# Patient Record
Sex: FEMALE | Race: WHITE | NOT HISPANIC OR LATINO | Employment: PART TIME | URBAN - METROPOLITAN AREA
[De-identification: names, ages, dates, MRNs, and addresses within clinical notes are randomized per-mention and may not be internally consistent; named-entity substitution may affect disease eponyms.]

---

## 2021-04-08 DIAGNOSIS — Z23 ENCOUNTER FOR IMMUNIZATION: ICD-10-CM

## 2021-10-26 ENCOUNTER — APPOINTMENT (EMERGENCY)
Dept: RADIOLOGY | Facility: HOSPITAL | Age: 66
End: 2021-10-26
Payer: MEDICARE

## 2021-10-26 ENCOUNTER — APPOINTMENT (EMERGENCY)
Dept: CT IMAGING | Facility: HOSPITAL | Age: 66
End: 2021-10-26
Payer: MEDICARE

## 2021-10-26 ENCOUNTER — HOSPITAL ENCOUNTER (EMERGENCY)
Facility: HOSPITAL | Age: 66
Discharge: HOME | End: 2021-10-26
Attending: EMERGENCY MEDICINE | Admitting: EMERGENCY MEDICINE
Payer: MEDICARE

## 2021-10-26 VITALS
OXYGEN SATURATION: 98 % | DIASTOLIC BLOOD PRESSURE: 70 MMHG | RESPIRATION RATE: 18 BRPM | TEMPERATURE: 98 F | BODY MASS INDEX: 25.11 KG/M2 | SYSTOLIC BLOOD PRESSURE: 119 MMHG | WEIGHT: 133 LBS | HEART RATE: 58 BPM | HEIGHT: 61 IN

## 2021-10-26 DIAGNOSIS — R42 DIZZINESS: Primary | ICD-10-CM

## 2021-10-26 LAB
ALBUMIN SERPL BCP-MCNC: 4.5 G/DL (ref 3.5–5)
ALP SERPL-CCNC: 90 U/L (ref 46–116)
ALT SERPL W P-5'-P-CCNC: 22 U/L (ref 12–78)
ANION GAP SERPL CALCULATED.3IONS-SCNC: 10 MMOL/L (ref 4–13)
AST SERPL W P-5'-P-CCNC: 20 U/L (ref 5–45)
BASOPHILS # BLD MANUAL: 0 THOUSAND/UL (ref 0–0.1)
BASOPHILS NFR MAR MANUAL: 0 % (ref 0–1)
BILIRUB SERPL-MCNC: 0.32 MG/DL (ref 0.2–1)
BILIRUB UR QL STRIP: NEGATIVE
BUN SERPL-MCNC: 7 MG/DL (ref 5–25)
CALCIUM SERPL-MCNC: 9.8 MG/DL (ref 8.3–10.1)
CHLORIDE SERPL-SCNC: 103 MMOL/L (ref 100–108)
CLARITY UR: CLEAR
CO2 SERPL-SCNC: 27 MMOL/L (ref 21–32)
COLOR UR: YELLOW
CREAT SERPL-MCNC: 0.84 MG/DL (ref 0.6–1.3)
EOSINOPHIL # BLD MANUAL: 0 THOUSAND/UL (ref 0–0.4)
EOSINOPHIL NFR BLD MANUAL: 0 % (ref 0–6)
ERYTHROCYTE [DISTWIDTH] IN BLOOD BY AUTOMATED COUNT: 14.2 % (ref 11.6–15.1)
GFR SERPL CREATININE-BSD FRML MDRD: 73 ML/MIN/1.73SQ M
GLUCOSE SERPL-MCNC: 100 MG/DL (ref 65–140)
GLUCOSE UR STRIP-MCNC: NEGATIVE MG/DL
HCT VFR BLD AUTO: 44.5 % (ref 34.8–46.1)
HGB BLD-MCNC: 14.3 G/DL (ref 11.5–15.4)
HGB UR QL STRIP.AUTO: NEGATIVE
KETONES UR STRIP-MCNC: NEGATIVE MG/DL
LEUKOCYTE ESTERASE UR QL STRIP: NEGATIVE
LYMPHOCYTES # BLD AUTO: 1.75 THOUSAND/UL (ref 0.6–4.47)
LYMPHOCYTES # BLD AUTO: 31 % (ref 14–44)
MAGNESIUM SERPL-MCNC: 2.2 MG/DL (ref 1.6–2.6)
MCH RBC QN AUTO: 28.3 PG (ref 26.8–34.3)
MCHC RBC AUTO-ENTMCNC: 32.1 G/DL (ref 31.4–37.4)
MCV RBC AUTO: 88 FL (ref 82–98)
MONOCYTES # BLD AUTO: 0.17 THOUSAND/UL (ref 0–1.22)
MONOCYTES NFR BLD: 3 % (ref 4–12)
NEUTROPHILS # BLD MANUAL: 3.72 THOUSAND/UL (ref 1.85–7.62)
NEUTS SEG NFR BLD AUTO: 66 % (ref 43–75)
NITRITE UR QL STRIP: NEGATIVE
PH UR STRIP.AUTO: 8 [PH]
PLATELET # BLD AUTO: 187 THOUSANDS/UL (ref 149–390)
PLATELET BLD QL SMEAR: ADEQUATE
PMV BLD AUTO: 10.7 FL (ref 8.9–12.7)
POTASSIUM SERPL-SCNC: 4 MMOL/L (ref 3.5–5.3)
PROT SERPL-MCNC: 8.1 G/DL (ref 6.4–8.2)
PROT UR STRIP-MCNC: NEGATIVE MG/DL
RBC # BLD AUTO: 5.06 MILLION/UL (ref 3.81–5.12)
RBC MORPH BLD: NORMAL
SODIUM SERPL-SCNC: 140 MMOL/L (ref 136–145)
SP GR UR STRIP.AUTO: 1.01 (ref 1–1.03)
TROPONIN I SERPL-MCNC: <0.02 NG/ML
UROBILINOGEN UR QL STRIP.AUTO: 0.2 E.U./DL
WBC # BLD AUTO: 5.64 THOUSAND/UL (ref 4.31–10.16)

## 2021-10-26 PROCEDURE — 85007 BL SMEAR W/DIFF WBC COUNT: CPT

## 2021-10-26 PROCEDURE — 80053 COMPREHEN METABOLIC PANEL: CPT

## 2021-10-26 PROCEDURE — 81003 URINALYSIS AUTO W/O SCOPE: CPT | Performed by: EMERGENCY MEDICINE

## 2021-10-26 PROCEDURE — 99285 EMERGENCY DEPT VISIT HI MDM: CPT | Performed by: EMERGENCY MEDICINE

## 2021-10-26 PROCEDURE — 83735 ASSAY OF MAGNESIUM: CPT

## 2021-10-26 PROCEDURE — 70496 CT ANGIOGRAPHY HEAD: CPT

## 2021-10-26 PROCEDURE — 36415 COLL VENOUS BLD VENIPUNCTURE: CPT

## 2021-10-26 PROCEDURE — 71045 X-RAY EXAM CHEST 1 VIEW: CPT

## 2021-10-26 PROCEDURE — 85027 COMPLETE CBC AUTOMATED: CPT

## 2021-10-26 PROCEDURE — 70498 CT ANGIOGRAPHY NECK: CPT

## 2021-10-26 PROCEDURE — 99284 EMERGENCY DEPT VISIT MOD MDM: CPT

## 2021-10-26 PROCEDURE — 84484 ASSAY OF TROPONIN QUANT: CPT

## 2021-10-26 PROCEDURE — 96360 HYDRATION IV INFUSION INIT: CPT

## 2021-10-26 PROCEDURE — 96361 HYDRATE IV INFUSION ADD-ON: CPT

## 2021-10-26 PROCEDURE — 93005 ELECTROCARDIOGRAM TRACING: CPT

## 2021-10-26 RX ORDER — ONDANSETRON 4 MG/1
4 TABLET, ORALLY DISINTEGRATING ORAL ONCE
Status: COMPLETED | OUTPATIENT
Start: 2021-10-26 | End: 2021-10-26

## 2021-10-26 RX ORDER — CLOPIDOGREL BISULFATE 75 MG/1
75 TABLET ORAL DAILY
COMMUNITY
End: 2022-04-03 | Stop reason: HOSPADM

## 2021-10-26 RX ORDER — AZITHROMYCIN 250 MG/1
TABLET, FILM COATED ORAL
Qty: 6 TABLET | Refills: 0 | Status: SHIPPED | OUTPATIENT
Start: 2021-10-26 | End: 2021-10-30

## 2021-10-26 RX ORDER — ONDANSETRON 4 MG/1
TABLET, ORALLY DISINTEGRATING ORAL
Status: DISCONTINUED
Start: 2021-10-26 | End: 2021-10-26 | Stop reason: HOSPADM

## 2021-10-26 RX ORDER — CITALOPRAM 20 MG/1
20 TABLET ORAL DAILY
COMMUNITY

## 2021-10-26 RX ORDER — MECLIZINE HCL 12.5 MG/1
25 TABLET ORAL ONCE
Status: COMPLETED | OUTPATIENT
Start: 2021-10-26 | End: 2021-10-26

## 2021-10-26 RX ORDER — MECLIZINE HCL 12.5 MG/1
TABLET ORAL
Status: DISCONTINUED
Start: 2021-10-26 | End: 2021-10-26 | Stop reason: HOSPADM

## 2021-10-26 RX ORDER — ASPIRIN 325 MG
325 TABLET ORAL DAILY
COMMUNITY

## 2021-10-26 RX ORDER — MECLIZINE HYDROCHLORIDE 25 MG/1
25 TABLET ORAL 3 TIMES DAILY PRN
Qty: 20 TABLET | Refills: 0 | Status: SHIPPED | OUTPATIENT
Start: 2021-10-26 | End: 2022-04-02

## 2021-10-26 RX ADMIN — MECLIZINE 25 MG: 12.5 TABLET ORAL at 10:50

## 2021-10-26 RX ADMIN — SODIUM CHLORIDE 1000 ML: 0.9 INJECTION, SOLUTION INTRAVENOUS at 10:50

## 2021-10-26 RX ADMIN — ONDANSETRON 4 MG: 4 TABLET, ORALLY DISINTEGRATING ORAL at 10:50

## 2021-10-26 RX ADMIN — IOHEXOL 100 ML: 350 INJECTION, SOLUTION INTRAVENOUS at 11:09

## 2021-10-28 LAB
ATRIAL RATE: 51 BPM
P AXIS: 69 DEGREES
PR INTERVAL: 162 MS
QRS AXIS: 31 DEGREES
QRSD INTERVAL: 76 MS
QT INTERVAL: 458 MS
QTC INTERVAL: 422 MS
T WAVE AXIS: 39 DEGREES
VENTRICULAR RATE: 51 BPM

## 2021-10-28 PROCEDURE — 93010 ELECTROCARDIOGRAM REPORT: CPT | Performed by: INTERNAL MEDICINE

## 2022-02-28 PROBLEM — M70.61 TROCHANTERIC BURSITIS OF BOTH HIPS: Status: ACTIVE | Noted: 2022-02-28

## 2022-02-28 PROBLEM — M85.89 OSTEOPENIA OF MULTIPLE SITES: Status: ACTIVE | Noted: 2022-02-28

## 2022-02-28 PROBLEM — M70.62 TROCHANTERIC BURSITIS OF BOTH HIPS: Status: ACTIVE | Noted: 2022-02-28

## 2022-02-28 PROBLEM — E03.9 ACQUIRED HYPOTHYROIDISM: Status: ACTIVE | Noted: 2022-02-28

## 2022-02-28 PROBLEM — M47.816 LUMBAR SPONDYLOSIS: Status: ACTIVE | Noted: 2022-02-28

## 2022-02-28 PROBLEM — D47.2 MONOCLONAL GAMMOPATHY: Status: ACTIVE | Noted: 2022-02-28

## 2022-02-28 PROBLEM — M15.0 PRIMARY GENERALIZED (OSTEO)ARTHRITIS: Status: ACTIVE | Noted: 2022-02-28

## 2022-02-28 PROBLEM — I67.1 ANEURYSM OF CAVERNOUS PORTION OF LEFT INTERNAL CAROTID ARTERY: Status: ACTIVE | Noted: 2022-02-28

## 2022-03-16 ENCOUNTER — TELEPHONE (OUTPATIENT)
Dept: HEMATOLOGY ONCOLOGY | Facility: CLINIC | Age: 67
End: 2022-03-16

## 2022-04-02 ENCOUNTER — HOSPITAL ENCOUNTER (OUTPATIENT)
Facility: HOSPITAL | Age: 67
Setting detail: OBSERVATION
Discharge: HOME | End: 2022-04-03
Attending: EMERGENCY MEDICINE | Admitting: INTERNAL MEDICINE
Payer: MEDICARE

## 2022-04-02 ENCOUNTER — APPOINTMENT (EMERGENCY)
Dept: CT IMAGING | Facility: HOSPITAL | Age: 67
End: 2022-04-02
Payer: MEDICARE

## 2022-04-02 ENCOUNTER — HOSPITAL ENCOUNTER (EMERGENCY)
Facility: HOSPITAL | Age: 67
Discharge: LEFT BEFORE DISCHARGE | End: 2022-04-02
Attending: EMERGENCY MEDICINE | Admitting: EMERGENCY MEDICINE
Payer: MEDICARE

## 2022-04-02 VITALS
BODY MASS INDEX: 27.26 KG/M2 | HEIGHT: 62 IN | OXYGEN SATURATION: 93 % | HEART RATE: 63 BPM | DIASTOLIC BLOOD PRESSURE: 102 MMHG | SYSTOLIC BLOOD PRESSURE: 130 MMHG | WEIGHT: 148.15 LBS | TEMPERATURE: 98.3 F | RESPIRATION RATE: 16 BRPM

## 2022-04-02 DIAGNOSIS — H54.62 VISION LOSS OF LEFT EYE: ICD-10-CM

## 2022-04-02 DIAGNOSIS — H54.60 MONOCULAR VISION LOSS: ICD-10-CM

## 2022-04-02 DIAGNOSIS — G45.9 TIA (TRANSIENT ISCHEMIC ATTACK): Primary | ICD-10-CM

## 2022-04-02 DIAGNOSIS — R91.1 LUNG NODULE: Primary | ICD-10-CM

## 2022-04-02 PROBLEM — H53.462 LEFT HOMONYMOUS HEMIANOPSIA: Status: ACTIVE | Noted: 2022-04-02

## 2022-04-02 LAB
ALBUMIN SERPL BCP-MCNC: 3.8 G/DL (ref 3.5–5)
ALP SERPL-CCNC: 89 U/L (ref 46–116)
ALT SERPL W P-5'-P-CCNC: 24 U/L (ref 12–78)
ANION GAP SERPL CALCULATED.3IONS-SCNC: 10 MMOL/L (ref 4–13)
AST SERPL W P-5'-P-CCNC: 19 U/L (ref 5–45)
BASOPHILS # BLD AUTO: 0 THOUSANDS/ΜL (ref 0–0.1)
BASOPHILS NFR BLD AUTO: 0 % (ref 0–1)
BILIRUB SERPL-MCNC: 0.27 MG/DL (ref 0.2–1)
BUN SERPL-MCNC: 10 MG/DL (ref 5–25)
CALCIUM SERPL-MCNC: 9 MG/DL (ref 8.3–10.1)
CHLORIDE SERPL-SCNC: 104 MMOL/L (ref 100–108)
CO2 SERPL-SCNC: 26 MMOL/L (ref 21–32)
CREAT SERPL-MCNC: 0.86 MG/DL (ref 0.6–1.3)
EOSINOPHIL # BLD AUTO: 0 THOUSAND/ΜL (ref 0–0.61)
EOSINOPHIL NFR BLD AUTO: 0 % (ref 0–6)
ERYTHROCYTE [DISTWIDTH] IN BLOOD BY AUTOMATED COUNT: 14.2 % (ref 11.6–15.1)
GFR SERPL CREATININE-BSD FRML MDRD: 70 ML/MIN/1.73SQ M
GLUCOSE SERPL-MCNC: 126 MG/DL (ref 65–140)
HCT VFR BLD AUTO: 40.1 % (ref 34.8–46.1)
HGB BLD-MCNC: 13 G/DL (ref 11.5–15.4)
IMM GRANULOCYTES # BLD AUTO: 0.08 THOUSAND/UL (ref 0–0.2)
IMM GRANULOCYTES NFR BLD AUTO: 2 % (ref 0–2)
LYMPHOCYTES # BLD AUTO: 1.85 THOUSANDS/ΜL (ref 0.6–4.47)
LYMPHOCYTES NFR BLD AUTO: 35 % (ref 14–44)
MCH RBC QN AUTO: 28.5 PG (ref 26.8–34.3)
MCHC RBC AUTO-ENTMCNC: 32.4 G/DL (ref 31.4–37.4)
MCV RBC AUTO: 88 FL (ref 82–98)
MONOCYTES # BLD AUTO: 0.84 THOUSAND/ΜL (ref 0.17–1.22)
MONOCYTES NFR BLD AUTO: 16 % (ref 4–12)
NEUTROPHILS # BLD AUTO: 2.55 THOUSANDS/ΜL (ref 1.85–7.62)
NEUTS SEG NFR BLD AUTO: 47 % (ref 43–75)
NRBC BLD AUTO-RTO: 0 /100 WBCS
PLATELET # BLD AUTO: 150 THOUSANDS/UL (ref 149–390)
PMV BLD AUTO: 10.9 FL (ref 8.9–12.7)
POTASSIUM SERPL-SCNC: 3.8 MMOL/L (ref 3.5–5.3)
PROT SERPL-MCNC: 7.2 G/DL (ref 6.4–8.2)
RBC # BLD AUTO: 4.56 MILLION/UL (ref 3.81–5.12)
SODIUM SERPL-SCNC: 140 MMOL/L (ref 136–145)
WBC # BLD AUTO: 5.32 THOUSAND/UL (ref 4.31–10.16)

## 2022-04-02 PROCEDURE — 36415 COLL VENOUS BLD VENIPUNCTURE: CPT | Performed by: EMERGENCY MEDICINE

## 2022-04-02 PROCEDURE — G1004 CDSM NDSC: HCPCS

## 2022-04-02 PROCEDURE — 99285 EMERGENCY DEPT VISIT HI MDM: CPT | Performed by: EMERGENCY MEDICINE

## 2022-04-02 PROCEDURE — 99285 EMERGENCY DEPT VISIT HI MDM: CPT

## 2022-04-02 PROCEDURE — 85025 COMPLETE CBC W/AUTO DIFF WBC: CPT | Performed by: EMERGENCY MEDICINE

## 2022-04-02 PROCEDURE — 1124F ACP DISCUSS-NO DSCNMKR DOCD: CPT | Performed by: EMERGENCY MEDICINE

## 2022-04-02 PROCEDURE — 93005 ELECTROCARDIOGRAM TRACING: CPT

## 2022-04-02 PROCEDURE — 99284 EMERGENCY DEPT VISIT MOD MDM: CPT

## 2022-04-02 PROCEDURE — 70498 CT ANGIOGRAPHY NECK: CPT

## 2022-04-02 PROCEDURE — 70496 CT ANGIOGRAPHY HEAD: CPT

## 2022-04-02 PROCEDURE — 80053 COMPREHEN METABOLIC PANEL: CPT | Performed by: EMERGENCY MEDICINE

## 2022-04-02 PROCEDURE — 99220 PR INITIAL OBSERVATION CARE/DAY 70 MINUTES: CPT | Performed by: INTERNAL MEDICINE

## 2022-04-02 RX ORDER — ASPIRIN 81 MG/1
81 TABLET, CHEWABLE ORAL DAILY
Status: DISCONTINUED | OUTPATIENT
Start: 2022-04-03 | End: 2022-04-03 | Stop reason: HOSPADM

## 2022-04-02 RX ORDER — LEVOTHYROXINE SODIUM 88 UG/1
88 TABLET ORAL
Status: DISCONTINUED | OUTPATIENT
Start: 2022-04-03 | End: 2022-04-03 | Stop reason: HOSPADM

## 2022-04-02 RX ORDER — CYCLOBENZAPRINE HCL 10 MG
5 TABLET ORAL
Status: DISCONTINUED | OUTPATIENT
Start: 2022-04-02 | End: 2022-04-03 | Stop reason: HOSPADM

## 2022-04-02 RX ORDER — ASPIRIN 325 MG
325 TABLET ORAL DAILY
Status: DISCONTINUED | OUTPATIENT
Start: 2022-04-03 | End: 2022-04-02

## 2022-04-02 RX ORDER — LEVOTHYROXINE SODIUM 88 UG/1
44 TABLET ORAL
Status: DISCONTINUED | OUTPATIENT
Start: 2022-04-04 | End: 2022-04-03 | Stop reason: HOSPADM

## 2022-04-02 RX ORDER — BIOTIN 1 MG
1000 TABLET ORAL DAILY
Status: DISCONTINUED | OUTPATIENT
Start: 2022-04-03 | End: 2022-04-02

## 2022-04-02 RX ORDER — CLOPIDOGREL BISULFATE 75 MG/1
75 TABLET ORAL DAILY
Status: DISCONTINUED | OUTPATIENT
Start: 2022-04-03 | End: 2022-04-03 | Stop reason: HOSPADM

## 2022-04-02 RX ORDER — CLOPIDOGREL BISULFATE 75 MG/1
300 TABLET ORAL ONCE
Status: COMPLETED | OUTPATIENT
Start: 2022-04-02 | End: 2022-04-02

## 2022-04-02 RX ORDER — CITALOPRAM 20 MG/1
20 TABLET ORAL DAILY
Status: DISCONTINUED | OUTPATIENT
Start: 2022-04-03 | End: 2022-04-03 | Stop reason: HOSPADM

## 2022-04-02 RX ORDER — LEVOTHYROXINE SODIUM 88 UG/1
TABLET ORAL
COMMUNITY
Start: 2022-03-04 | End: 2022-04-03 | Stop reason: HOSPADM

## 2022-04-02 RX ORDER — ZINC SULFATE 50(220)MG
50 CAPSULE ORAL DAILY
COMMUNITY

## 2022-04-02 RX ORDER — CYCLOBENZAPRINE HCL 5 MG
TABLET ORAL
COMMUNITY
Start: 2022-01-06 | End: 2022-04-03 | Stop reason: HOSPADM

## 2022-04-02 RX ORDER — LANOLIN ALCOHOL/MO/W.PET/CERES
10 CREAM (GRAM) TOPICAL
Status: DISCONTINUED | OUTPATIENT
Start: 2022-04-02 | End: 2022-04-03 | Stop reason: HOSPADM

## 2022-04-02 RX ORDER — ATORVASTATIN CALCIUM 40 MG/1
40 TABLET, FILM COATED ORAL
Status: DISCONTINUED | OUTPATIENT
Start: 2022-04-02 | End: 2022-04-03 | Stop reason: HOSPADM

## 2022-04-02 RX ORDER — BIOTIN 1 MG
1000 TABLET ORAL DAILY
COMMUNITY

## 2022-04-02 RX ORDER — MELATONIN
5000 DAILY
Status: DISCONTINUED | OUTPATIENT
Start: 2022-04-03 | End: 2022-04-03 | Stop reason: HOSPADM

## 2022-04-02 RX ORDER — LEVOTHYROXINE SODIUM 88 UG/1
88 TABLET ORAL
Status: DISCONTINUED | OUTPATIENT
Start: 2022-04-03 | End: 2022-04-02

## 2022-04-02 RX ADMIN — Medication 10.5 MG: at 22:19

## 2022-04-02 RX ADMIN — ATORVASTATIN CALCIUM 40 MG: 40 TABLET, FILM COATED ORAL at 17:56

## 2022-04-02 RX ADMIN — CLOPIDOGREL BISULFATE 300 MG: 75 TABLET ORAL at 19:21

## 2022-04-02 RX ADMIN — IOHEXOL 85 ML: 350 INJECTION, SOLUTION INTRAVENOUS at 13:54

## 2022-04-02 NOTE — ASSESSMENT & PLAN NOTE
Patient with past medical history significant for brain aneurism s/p coiling, hypothyroidism and smoking had several intermitant episodes of left-sided hemianopsia yesterday that resolved spontaneously after lasting "a few minutes' each per patient  Patient also reports two additional similar episodes today  Of note, patient reports that she recently discontinued home ASA in setting of bone marrow biopsy scheduled for this Monday  Currently without deficits and denying symptoms  Neurology saw patient in ED  Recommend stroke pathway  Workup:   Blood Pressure: 118/70  · EKG on presentation to the ER showed sinus bradycardia     CTA head and neck with and without contrast    Result Date: 4/2/2022  Impression: 1  Stable pipeline stent with coil embolization in the left parasellar region  No residual aneurysmal flow by CTA technique  No definite significant coil impaction by CTA  2   No acute intracranial hemorrhage  3   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, a developmental variant with mild narrowing at the origin, stable  No hemodynamically significant stenosis in the major arteries of the neck  4   4 mm groundglass nodule in the right lung not definitely identified previously  Differential considerations include infectious, inflammatory or neoplastic etiologies  Recommend follow-up repeat CT of the chest 3 months to assess for resolution  No Chest XR results available for this patient  · ABCD Score: 1    Plan - Stroke pathway:  · MRI brain and orbit,  monitor on telemetry  · Lipid Panel, HbA1c  · Atorvastatin 40 mg q d , Aspirin 81 q d    · Plavix 75 mg daily  · Consult Neurology  · PT/OT/Speech eval  · Allow for permissive hypertension with -200 for 48 hours  · Neuro checks

## 2022-04-02 NOTE — ED PROVIDER NOTES
History  Chief Complaint   Patient presents with    Eye Problem     Left sided vision loss, started yesterday at unknown time  "feels like shade is over my eye "       History provided by:  Patient   used: No    Eye Problem  Associated symptoms: no headaches, no nausea, no photophobia, no redness, no vomiting and no weakness      Patient is a 78-year-old female presenting to emergency department with left-sided visual loss  Intermittent for last 24 hours  No history of the same  No falls or trauma  No headache  No neck pain  No weakness numbness or tingling  No fevers or chills  History of brain aneurysm, had coiling done previously  Takes aspirin daily  Stopped for the last 3 days due to need for outpatient procedure next week  No history of stroke  Smoker  MDM will evaluate for TIA, will need admission for neurological evaluation and observation  Visual acuity normal   Eye pressure is normal bilateral       Prior to Admission Medications   Prescriptions Last Dose Informant Patient Reported? Taking? Cholecalciferol (Vitamin D) 125 MCG (5000 UT) CAPS   Yes No   Sig: Take by mouth   Levothyroxine Sodium 88 MCG CAPS  Self Yes No   Sig: Take 88 mcg by mouth daily     Melatonin 10 MG TABS   Yes No   Sig: Take 10 mg by mouth daily at bedtime     aspirin 325 mg tablet  Self Yes No   Sig: Take 325 mg by mouth daily   citalopram (CeleXA) 20 mg tablet  Self Yes No   Sig: Take 20 mg by mouth daily   clopidogrel (PLAVIX) 75 mg tablet  Self Yes No   Sig: Take 75 mg by mouth daily   zinc gluconate 50 mg tablet   Yes No   Sig: Take 50 mg by mouth daily      Facility-Administered Medications: None       Past Medical History:   Diagnosis Date    Anxiety and depression     Brain aneurysm     Cerebral aneurysm without rupture     Disease of thyroid gland     H/O toxic shock syndrome     HPV (human papilloma virus) infection     Hypothyroidism     Meningioma (HCC)     Microhematuria     Monoclonal gammopathy     Osteoarthritis        Past Surgical History:   Procedure Laterality Date    AUGMENTATION BREAST      CEREBRAL ANEURYSM REPAIR      intracranial stent    CYSTOSCOPY      FOOT SURGERY Right     bunionectomy    HAND SURGERY Right     CTR    IR CEREBRAL ANEURYSM COILING      OOPHORECTOMY Right     SALPINGECTOMY         Family History   Problem Relation Age of Onset    Heart failure Mother     Arthritis Mother     Hypothyroidism Mother     Hypertension Mother     Hyperlipidemia Mother     Stroke Mother     Lung cancer Father     Emphysema Father     Heart disease Brother      I have reviewed and agree with the history as documented  E-Cigarette/Vaping     E-Cigarette/Vaping Substances     Social History     Tobacco Use    Smoking status: Current Every Day Smoker     Packs/day: 1 00    Smokeless tobacco: Never Used   Substance Use Topics    Alcohol use: No    Drug use: Yes     Types: Marijuana     Comment: medical marijuana       Review of Systems   Constitutional: Negative for chills, diaphoresis and fever  HENT: Negative for congestion and sore throat  Eyes: Positive for visual disturbance  Negative for photophobia, pain and redness  Respiratory: Negative for cough, shortness of breath, wheezing and stridor  Cardiovascular: Negative for chest pain, palpitations and leg swelling  Gastrointestinal: Negative for abdominal pain, blood in stool, diarrhea, nausea and vomiting  Genitourinary: Negative for dysuria, frequency and urgency  Musculoskeletal: Negative for neck pain and neck stiffness  Skin: Negative for pallor and rash  Neurological: Negative for dizziness, syncope, weakness, light-headedness and headaches  All other systems reviewed and are negative  Physical Exam  Physical Exam  Vitals reviewed  Constitutional:       Appearance: Normal appearance  She is well-developed  HENT:      Head: Normocephalic and atraumatic     Eyes: Extraocular Movements: Extraocular movements intact  Pupils: Pupils are equal, round, and reactive to light  Cardiovascular:      Rate and Rhythm: Normal rate and regular rhythm  Heart sounds: Normal heart sounds  Pulmonary:      Effort: Pulmonary effort is normal  No respiratory distress  Breath sounds: Normal breath sounds  Abdominal:      General: Bowel sounds are normal       Palpations: Abdomen is soft  Tenderness: There is no abdominal tenderness  Musculoskeletal:         General: No swelling or tenderness  Normal range of motion  Cervical back: Normal range of motion and neck supple  Skin:     General: Skin is warm and dry  Capillary Refill: Capillary refill takes less than 2 seconds  Neurological:      General: No focal deficit present  Mental Status: She is alert and oriented to person, place, and time  Cranial Nerves: No cranial nerve deficit  Sensory: No sensory deficit  Motor: No weakness  Coordination: Coordination normal       Gait: Gait normal          Vital Signs  ED Triage Vitals [04/02/22 1222]   Temperature Pulse Respirations Blood Pressure SpO2   98 3 °F (36 8 °C) 75 18 122/71 97 %      Temp Source Heart Rate Source Patient Position - Orthostatic VS BP Location FiO2 (%)   Oral Monitor Lying Right arm --      Pain Score       No Pain           Vitals:    04/02/22 1222 04/02/22 1300 04/02/22 1400 04/02/22 1512   BP: 122/71 119/66 132/72 (!) 130/102   Pulse: 75 66 62 63   Patient Position - Orthostatic VS: Lying            Visual Acuity  Visual Acuity      Most Recent Value   Visual acuity R eye is 20/25   Visual acuity Left eye is 20/20   Visual acuity in both eyes is 20/20   Wearing corrective eyewear/lenses?  Yes   L Pupil Size (mm) 3   R Pupil Size (mm) 3          ED Medications  Medications   iohexol (OMNIPAQUE) 350 MG/ML injection (SINGLE-DOSE) 85 mL (85 mL Intravenous Given 4/2/22 9205)       Diagnostic Studies  Results Reviewed     Procedure Component Value Units Date/Time    Comprehensive metabolic panel [[id number]] Collected: 04/02/22 1251    Lab Status: Final result Specimen: Blood from Arm, Left Updated: 04/02/22 1325     Sodium 140 mmol/L      Potassium 3 8 mmol/L      Chloride 104 mmol/L      CO2 26 mmol/L      ANION GAP 10 mmol/L      BUN 10 mg/dL      Creatinine 0 86 mg/dL      Glucose 126 mg/dL      Calcium 9 0 mg/dL      AST 19 U/L      ALT 24 U/L      Alkaline Phosphatase 89 U/L      Total Protein 7 2 g/dL      Albumin 3 8 g/dL      Total Bilirubin 0 27 mg/dL      eGFR 70 ml/min/1 73sq m     Narrative:      National Kidney Disease Foundation guidelines for Chronic Kidney Disease (CKD):     Stage 1 with normal or high GFR (GFR > 90 mL/min/1 73 square meters)    Stage 2 Mild CKD (GFR = 60-89 mL/min/1 73 square meters)    Stage 3A Moderate CKD (GFR = 45-59 mL/min/1 73 square meters)    Stage 3B Moderate CKD (GFR = 30-44 mL/min/1 73 square meters)    Stage 4 Severe CKD (GFR = 15-29 mL/min/1 73 square meters)    Stage 5 End Stage CKD (GFR <15 mL/min/1 73 square meters)  Note: GFR calculation is accurate only with a steady state creatinine    CBC and differential [[id number]]  (Abnormal) Collected: 04/02/22 1251    Lab Status: Final result Specimen: Blood from Arm, Left Updated: 04/02/22 1302     WBC 5 32 Thousand/uL      RBC 4 56 Million/uL      Hemoglobin 13 0 g/dL      Hematocrit 40 1 %      MCV 88 fL      MCH 28 5 pg      MCHC 32 4 g/dL      RDW 14 2 %      MPV 10 9 fL      Platelets 351 Thousands/uL      nRBC 0 /100 WBCs      Neutrophils Relative 47 %      Immat GRANS % 2 %      Lymphocytes Relative 35 %      Monocytes Relative 16 %      Eosinophils Relative 0 %      Basophils Relative 0 %      Neutrophils Absolute 2 55 Thousands/µL      Immature Grans Absolute 0 08 Thousand/uL      Lymphocytes Absolute 1 85 Thousands/µL      Monocytes Absolute 0 84 Thousand/µL      Eosinophils Absolute 0 00 Thousand/µL Basophils Absolute 0 00 Thousands/µL                  CTA head and neck with and without contrast   Final Result by Angela Siu MD (04/02 1427)         1  Stable pipeline stent with coil embolization in the left parasellar region  No residual aneurysmal flow by CTA technique  No definite significant coil impaction by CTA  2   No acute intracranial hemorrhage  3   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, a developmental variant with mild narrowing at the origin, stable  No hemodynamically significant stenosis in the major arteries of the neck  4   4 mm groundglass nodule in the right lung not definitely identified previously  Differential considerations include infectious, inflammatory or neoplastic etiologies  Recommend follow-up repeat CT of the chest 3 months to assess for resolution  Workstation performed: [id number]                    Procedures  Procedures         ED Course  ED Course as of 04/02/22 1907   Sat Apr 02, 2022   1307 ECG shows rate of 65, sinus, normal axis, normal QRS, no significant ST or T-wave changes, normal intervals, independently interpreted by me                               SBIRT 22yo+      Most Recent Value   SBIRT (23 yo +)    In order to provide better care to our patients, we are screening all of our patients for alcohol and drug use  Would it be okay to ask you these screening questions? Yes Filed at: 04/02/2022 1223   Initial Alcohol Screen: US AUDIT-C     1  How often do you have a drink containing alcohol? 0 Filed at: 04/02/2022 1223   2  How many drinks containing alcohol do you have on a typical day you are drinking? 0 Filed at: 04/02/2022 1223   3a  Male UNDER 65: How often do you have five or more drinks on one occasion? 0 Filed at: 04/02/2022 1223   3b  FEMALE Any Age, or MALE 65+: How often do you have 4 or more drinks on one occassion?  0 Filed at: 04/02/2022 1223   Audit-C Score 0 Filed at: 04/02/2022 1223   NELSON: How many times in the past year have you    Used an illegal drug or used a prescription medication for non-medical reasons? Never Filed at: 04/02/2022 1223                    MDM    Disposition  Final diagnoses:   Lung nodule   Monocular vision loss     Time reflects when diagnosis was documented in both MDM as applicable and the Disposition within this note     Time User Action Codes Description Comment    4/2/2022  3:09 PM Marie Blanc Add [R91 1] Lung nodule     4/2/2022  3:09 PM Marie Blanc Add [H54 60] Monocular vision loss       ED Disposition     ED Disposition Condition Date/Time Comment    [facility] Apr 2, 2022  3:10 PM Date: 4/2/2022  Patient: Andrew Correa  Admitted: 4/2/2022 12:24 PM  Attending Provider: Nathan Marte MD    Andrew Correa or her authorized caregiver has made the decision for the patient to leave the emergency department against the advice of her a ttending physician  She or her authorized caregiver has been informed and understands the inherent risks, including death, vision loss, stroke, head bleed, permanent disability     She or her authorized caregiver has decided to accept the responsibili ty for this decision  Andrew Correa and all necessary parties have been advised that she may return for further evaluation or treatment  Her condition at time of discharge was Serious    Andrew Correa had current vital signs as follows:  /72   Pu lse 62   Temp 98 3 °F (36 8 °C) (Oral)   Resp 16   Ht 5' 2" (1 575 m)   Wt 67 2 kg (148 lb 2 4 oz)         Follow-up Information     Follow up With Specialties Details Why Contact Info Additional Willi Kimball MD Internal Medicine Go to  as soon as possible [phone]  [facility] Emergency Department Emergency Medicine  if you change your mind and want continued treatment and evaluation Jose Antonio [address] [phone] [facility] [address] Emergency Department, [address], [place], [place], [address] [facility] Go to  as soon as possible [address] [facility] Neurology [address] [facility] [address] [place] [address] [facility] [address], [place], [place], Rubina Mcneil MD Ophthalmology Go to  as soon as possible [facility] Emergency Department [address] [place] [address]  [phone]             Discharge Medication List as of 4/2/2022  3:13 PM      CONTINUE these medications which have NOT CHANGED    Details   aspirin 325 mg tablet Take 325 mg by mouth daily, Historical Med      Cholecalciferol (Vitamin D) 125 MCG (5000 UT) CAPS Take by mouth, Historical Med      citalopram (CeleXA) 20 mg tablet Take 20 mg by mouth daily, Historical Med      clopidogrel (PLAVIX) 75 mg tablet Take 75 mg by mouth daily, Historical Med      Levothyroxine Sodium 88 MCG CAPS Take 88 mcg by mouth daily  , Historical Med      Melatonin 10 MG TABS Take by mouth, Historical Med      zinc gluconate 50 mg tablet Take 50 mg by mouth daily, Historical Med      meclizine (ANTIVERT) 25 mg tablet Take 1 tablet (25 mg total) by mouth 3 (three) times a day as needed for dizziness, Starting Tue 10/26/2021, Normal             No discharge procedures on file      PDMP Review     None          ED Provider  Electronically Signed by           Horacio Davison MD  04/02/22 3357

## 2022-04-02 NOTE — ED NOTES
Patient awake, alert, and oriented x4 and able to make medical decisions        Ana Rosa Cordova RN  04/02/22 [address] BEATRICE Medina  04/02/22 0736

## 2022-04-02 NOTE — H&P
[facility]  H&P- Malinda Berry 1955, 77 y o  female MRN: [id number]  Unit/Bed#: ED 23 Encounter: [id number]  Primary Care Provider: Rita Escalera MD   Date and time admitted to hospital: 4/2/2022  5:22 PM    * Left homonymous hemianopsia  Assessment & Plan  Patient with past medical history significant for brain aneurism s/p coiling, hypothyroidism and smoking had several intermitant episodes of left-sided hemianopsia yesterday that resolved spontaneously after lasting "a few minutes' each per patient  Patient also reports two additional similar episodes today  Of note, patient reports that she recently discontinued home ASA in setting of bone marrow biopsy scheduled for this Monday  Currently without deficits and denying symptoms  Neurology saw patient in ED  Recommend stroke pathway  Workup:   Blood Pressure: 118/70  · EKG on presentation to the ER showed sinus bradycardia     CTA head and neck with and without contrast    Result Date: 4/2/2022  Impression: 1  Stable pipeline stent with coil embolization in the left parasellar region  No residual aneurysmal flow by CTA technique  No definite significant coil impaction by CTA  2   No acute intracranial hemorrhage  3   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, a developmental variant with mild narrowing at the origin, stable  No hemodynamically significant stenosis in the major arteries of the neck  4   4 mm groundglass nodule in the right lung not definitely identified previously  Differential considerations include infectious, inflammatory or neoplastic etiologies  Recommend follow-up repeat CT of the chest 3 months to assess for resolution  No Chest XR results available for this patient  · ABCD Score: 1    Plan - Stroke pathway:  · MRI brain and orbit,  monitor on telemetry  · Lipid Panel, HbA1c  · Atorvastatin 40 mg q d , Aspirin 81 q d    · Plavix 75 mg daily  · Consult Neurology  · PT/OT/Speech eval  · Allow for permissive hypertension with -200 for 48 hours  · Neuro checks      Hypothyroidism  Assessment & Plan  · Continue home levothyroxine regimen     Monoclonal gammopathy  Assessment & Plan  · Patient reports she was scheduled for bone marrow biopsey this Monday   · Consider heme/onc consult   · Will require further workup outpatient    Lumbar spondylosis  Assessment & Plan  · Continue home flexeril      VTE Pharmacologic Prophylaxis: VTE Score: 8 Moderate Risk (Score 3-4) - Pharmacological DVT Prophylaxis Ordered: enoxaparin (Lovenox)  Code Status: Level 1 - Full Code   Discussion with family: Patient declined call to   Anticipated Length of Stay: Patient will be admitted on an inpatient basis with an anticipated length of stay of greater than 2 midnights secondary to L hemianopsia  Chief Complaint: Visual disturbance     History of Present Illness:  Darlene Hodge is a 77 y o  female with a PMH of hypothyroidism, smoking, brain aneurism s/p coiling and lumbar spondylosis  who presents with a two day history of episodic hemianopsia  Patient reports that she had recently discontinued home ASA in setting of bone marrow biopsey planned for Monday in setting of monoclonal gammopathy  Presented to ED earlier today for symptoms, but left AMA when informed of plans for admission  Now returns to the hospital for further workup  CT showed no focal acute anomaly  Neurology evaluated in ED with concern for amaurosis fugax  Currently without symptoms  Neurological exam normal with no focal deficits at time of admission  Started along stroke pathway  Will continue to monitor  Review of Systems:  Review of Systems   Constitutional: Negative for chills and fever  HENT: Negative for ear pain and sore throat  Eyes: Positive for visual disturbance  Negative for pain  Respiratory: Negative for cough and shortness of breath  Cardiovascular: Negative for chest pain and palpitations  Gastrointestinal: Negative for abdominal pain and vomiting  Genitourinary: Negative for dysuria and hematuria  Musculoskeletal: Negative for arthralgias and back pain  Skin: Negative for color change and rash  Neurological: Negative for seizures and syncope  All other systems reviewed and are negative  Past Medical and Surgical History:   Past Medical History:   Diagnosis Date    Anxiety and depression     Brain aneurysm     Cerebral aneurysm without rupture     Disease of thyroid gland     H/O toxic shock syndrome     HPV (human papilloma virus) infection     Hypothyroidism     Meningioma (HCC)     Microhematuria     Monoclonal gammopathy     Osteoarthritis        Past Surgical History:   Procedure Laterality Date    AUGMENTATION BREAST      CEREBRAL ANEURYSM REPAIR      intracranial stent    CYSTOSCOPY      FOOT SURGERY Right     bunionectomy    HAND SURGERY Right     CTR    IR CEREBRAL ANEURYSM COILING      OOPHORECTOMY Right     SALPINGECTOMY         Meds/Allergies:  Prior to Admission medications    Medication Sig Start Date End Date Taking? Authorizing Provider   aspirin 325 mg tablet Take 325 mg by mouth daily   Yes Historical Provider, MD   Biotin 1000 MCG tablet Take 1,000 mcg by mouth daily     Yes Historical Provider, MD   Cholecalciferol (Vitamin D) 125 MCG (5000 UT) CAPS Take by mouth   Yes Historical Provider, MD   citalopram (CeleXA) 20 mg tablet Take 20 mg by mouth daily   Yes Historical Provider, MD   clopidogrel (PLAVIX) 75 mg tablet Take 75 mg by mouth daily   Yes Historical Provider, MD   cyclobenzaprine (FLEXERIL) 5 mg tablet TAKE 1 TABLET BY MOUTH NIGHTLY AS NEEDED FOR MUSCLE SPASMS 1/6/22  Yes Historical Provider, MD   levothyroxine 88 mcg tablet 1 1/2 TAB ON M-W-F AND 1 TAB ON ALL OTHER DAYS 3/4/22  Yes Historical Provider, MD   Levothyroxine Sodium 88 MCG CAPS Take 88 mcg by mouth daily     Yes Historical Provider, MD   Melatonin 10 MG TABS Take 10 mg by mouth daily at bedtime     Yes Historical Provider, MD   zinc gluconate 50 mg tablet Take 50 mg by mouth daily   Yes Historical Provider, MD   zinc sulfate (ZINCATE) 220 mg capsule Take 50 mg by mouth daily     Yes Historical Provider, MD   meclizine (ANTIVERT) 25 mg tablet Take 1 tablet (25 mg total) by mouth 3 (three) times a day as needed for dizziness  Patient not taking: Reported on 12/21/2021  10/26/21 4/2/22  Alyson Beyer DO     I have reviewed home medications with patient personally  Allergies: No Known Allergies    Social History:  Marital Status: Single   Patient Pre-hospital Living Situation: Home  Patient Pre-hospital Level of Mobility: walks  Patient Pre-hospital Diet Restrictions: None  Substance Use History:   Social History     Substance and Sexual Activity   Alcohol Use No     Social History     Tobacco Use   Smoking Status Current Every Day Smoker    Packs/day: 1 00   Smokeless Tobacco Never Used     Social History     Substance and Sexual Activity   Drug Use Yes    Types: Marijuana    Comment: medical marijuana       Family History:  Family History   Problem Relation Age of Onset    Heart failure Mother     Arthritis Mother     Hypothyroidism Mother     Hypertension Mother     Hyperlipidemia Mother     Stroke Mother     Lung cancer Father     Emphysema Father     Heart disease Brother        Physical Exam:     Vitals:   Blood Pressure: 118/70 (04/02/22 1840)  Pulse: 76 (04/02/22 1840)  Temperature: 98 5 °F (36 9 °C) (04/02/22 1840)  Temp Source: Oral (04/02/22 1840)  Respirations: 16 (04/02/22 1840)  Height: 5' 2" (157 5 cm) (04/02/22 1720)  Weight - Scale: 67 2 kg (148 lb 2 4 oz) (04/02/22 1720)  SpO2: 98 % (04/02/22 1840)    Physical Exam  Vitals and nursing note reviewed  Constitutional:       General: She is not in acute distress  Appearance: She is well-developed     HENT:      Head: Normocephalic and atraumatic  Eyes:      Conjunctiva/sclera: Conjunctivae normal    Cardiovascular:      Rate and Rhythm: Normal rate and regular rhythm  Heart sounds: No murmur heard  Pulmonary:      Effort: Pulmonary effort is normal  No respiratory distress  Breath sounds: Normal breath sounds  Abdominal:      Palpations: Abdomen is soft  Tenderness: There is no abdominal tenderness  Musculoskeletal:      Cervical back: Neck supple  Skin:     General: Skin is warm and dry  Neurological:      General: No focal deficit present  Mental Status: She is alert  Psychiatric:         Attention and Perception: Attention and perception normal          Mood and Affect: Mood and affect normal          Speech: Speech normal          Behavior: Behavior normal           Additional Data:     Lab Results:  Results from last 7 days   Lab Units 04/02/22  1251   WBC Thousand/uL 5 32   HEMOGLOBIN g/dL 13 0   HEMATOCRIT % 40 1   PLATELETS Thousands/uL 150   NEUTROS PCT % 47   LYMPHS PCT % 35   MONOS PCT % 16*   EOS PCT % 0     Results from last 7 days   Lab Units 04/02/22  1251   SODIUM mmol/L 140   POTASSIUM mmol/L 3 8   CHLORIDE mmol/L 104   CO2 mmol/L 26   BUN mg/dL 10   CREATININE mg/dL 0 86   ANION GAP mmol/L 10   CALCIUM mg/dL 9 0   ALBUMIN g/dL 3 8   TOTAL BILIRUBIN mg/dL 0 27   ALK PHOS U/L 89   ALT U/L 24   AST U/L 19   GLUCOSE RANDOM mg/dL 126                       Imaging: Reviewed radiology reports from this admission including: CT head  MRI Inpatient Order    (Results Pending)       EKG and Other Studies Reviewed on Admission:   · EKG: Sinus Bradycardia  HR 52 BPM     ** Please Note: This note has been constructed using a voice recognition system   **

## 2022-04-02 NOTE — ASSESSMENT & PLAN NOTE
· Patient reports she was scheduled for bone marrow biopsey this Monday   · Consider heme/onc consult   · Will require further workup outpatient

## 2022-04-02 NOTE — ED PROVIDER NOTES
History  Chief Complaint   Patient presents with    Visual Field Change     Pt here earlier for vision loss of left eye; was supposed to be admitted but had to take care of something at home prior to admission  History provided by:  Patient   used: No      Patient is a 80-year-old female presenting to emergency department with multiple episodes of left-sided acute vision loss in the last 24 hours  Currently asymptomatic  No headache  No nausea vomiting  No weakness numbness or tingling  No speech changes  No had similar before  No head trauma  Wears glasses  Does wear contact sometimes  She was seen earlier today and left against medical advice  Called her neurosurgeon who recommended she takes aspirin  She did have CTA of head done earlier today without acute changes  MDM discussed with Neurology, recommended admission on stroke pathway, aspirin Plavix and Lipitor  Patient would like to discuss with her neurosurgeon before taking Plavix  Prior to Admission Medications   Prescriptions Last Dose Informant Patient Reported? Taking? Biotin 1000 MCG tablet   Yes Yes   Sig: Take 1,000 mcg by mouth daily     Cholecalciferol (Vitamin D) 125 MCG (5000 UT) CAPS   Yes Yes   Sig: Take by mouth   Levothyroxine Sodium 88 MCG CAPS  Self Yes Yes   Sig: Take 88 mcg by mouth daily     Melatonin 10 MG TABS   Yes Yes   Sig: Take 10 mg by mouth daily at bedtime     aspirin 325 mg tablet  Self Yes Yes   Sig: Take 325 mg by mouth daily   citalopram (CeleXA) 20 mg tablet  Self Yes Yes   Sig: Take 20 mg by mouth daily   clopidogrel (PLAVIX) 75 mg tablet  Self Yes Yes   Sig: Take 75 mg by mouth daily   cyclobenzaprine (FLEXERIL) 5 mg tablet   Yes Yes   Sig: TAKE 1 TABLET BY MOUTH NIGHTLY AS NEEDED FOR MUSCLE SPASMS   levothyroxine 88 mcg tablet   Yes Yes   Si 1/2 TAB ON -- AND 1 TAB ON ALL OTHER DAYS   zinc gluconate 50 mg tablet   Yes Yes   Sig: Take 50 mg by mouth daily   zinc sulfate (ZINCATE) 220 mg capsule   Yes Yes   Sig: Take 50 mg by mouth daily        Facility-Administered Medications: None       Past Medical History:   Diagnosis Date    Anxiety and depression     Brain aneurysm     Cerebral aneurysm without rupture     Disease of thyroid gland     H/O toxic shock syndrome     HPV (human papilloma virus) infection     Hypothyroidism     Meningioma (HCC)     Microhematuria     Monoclonal gammopathy     Osteoarthritis        Past Surgical History:   Procedure Laterality Date    AUGMENTATION BREAST      CEREBRAL ANEURYSM REPAIR      intracranial stent    CYSTOSCOPY      FOOT SURGERY Right     bunionectomy    HAND SURGERY Right     CTR    IR CEREBRAL ANEURYSM COILING      OOPHORECTOMY Right     SALPINGECTOMY         Family History   Problem Relation Age of Onset    Heart failure Mother     Arthritis Mother     Hypothyroidism Mother     Hypertension Mother     Hyperlipidemia Mother     Stroke Mother     Lung cancer Father     Emphysema Father     Heart disease Brother      I have reviewed and agree with the history as documented  E-Cigarette/Vaping     E-Cigarette/Vaping Substances     Social History     Tobacco Use    Smoking status: Current Every Day Smoker     Packs/day: 1 00    Smokeless tobacco: Never Used   Substance Use Topics    Alcohol use: No    Drug use: Yes     Types: Marijuana     Comment: medical marijuana       Review of Systems   Constitutional: Negative for chills, diaphoresis and fever  HENT: Negative for congestion and sore throat  Eyes: Positive for visual disturbance  Negative for photophobia, pain and redness  Respiratory: Negative for cough, shortness of breath, wheezing and stridor  Cardiovascular: Negative for chest pain, palpitations and leg swelling  Gastrointestinal: Negative for abdominal pain, blood in stool, diarrhea, nausea and vomiting  Genitourinary: Negative for dysuria, frequency and urgency  Musculoskeletal: Negative for neck pain and neck stiffness  Skin: Negative for pallor and rash  Neurological: Negative for dizziness, syncope, weakness, light-headedness and headaches  All other systems reviewed and are negative  Physical Exam  Physical Exam  Vitals reviewed  Constitutional:       Appearance: Normal appearance  She is well-developed  HENT:      Head: Normocephalic and atraumatic  Eyes:      Extraocular Movements: Extraocular movements intact  Pupils: Pupils are equal, round, and reactive to light  Cardiovascular:      Rate and Rhythm: Normal rate and regular rhythm  Heart sounds: Normal heart sounds  Pulmonary:      Effort: Pulmonary effort is normal  No respiratory distress  Breath sounds: Normal breath sounds  Abdominal:      General: Bowel sounds are normal       Palpations: Abdomen is soft  Tenderness: There is no abdominal tenderness  Musculoskeletal:         General: No swelling or tenderness  Normal range of motion  Cervical back: Normal range of motion and neck supple  Skin:     General: Skin is warm and dry  Capillary Refill: Capillary refill takes less than 2 seconds  Neurological:      General: No focal deficit present  Mental Status: She is alert and oriented to person, place, and time  Cranial Nerves: No cranial nerve deficit  Sensory: No sensory deficit  Motor: No weakness        Coordination: Coordination normal       Gait: Gait normal          Vital Signs  ED Triage Vitals   Temperature Pulse Respirations Blood Pressure SpO2   04/02/22 1722 04/02/22 1720 04/02/22 1720 04/02/22 1721 04/02/22 1720   98 3 °F (36 8 °C) 75 16 116/57 98 %      Temp Source Heart Rate Source Patient Position - Orthostatic VS BP Location FiO2 (%)   04/02/22 1722 04/02/22 1720 04/02/22 1720 04/02/22 1720 --   Oral Monitor Sitting Left arm       Pain Score       04/02/22 1840       No Pain           Vitals:    04/02/22 1720 04/02/22 1721 04/02/22 1840   BP:  116/57 118/70   Pulse: 75  76   Patient Position - Orthostatic VS: Sitting  Sitting         Visual Acuity      ED Medications  Medications   atorvastatin (LIPITOR) tablet 40 mg (40 mg Oral Given 4/2/22 1756)   aspirin tablet 325 mg (has no administration in time range)   Biotin 1 mg (has no administration in time range)   cholecalciferol (VITAMIN D3) tablet 5,000 Units (has no administration in time range)   citalopram (CeleXA) tablet 20 mg (has no administration in time range)   cyclobenzaprine (FLEXERIL) tablet 5 mg (has no administration in time range)   levothyroxine tablet 88 mcg (has no administration in time range)   melatonin tablet 10 5 mg (has no administration in time range)   nicotine (NICODERM CQ) 7 mg/24hr TD 24 hr patch 1 patch (has no administration in time range)   enoxaparin (LOVENOX) subcutaneous injection 40 mg (has no administration in time range)   clopidogrel (PLAVIX) tablet 75 mg (has no administration in time range)   clopidogrel (PLAVIX) tablet 300 mg (300 mg Oral Given 4/2/22 1921)       Diagnostic Studies  Results Reviewed     Procedure Component Value Units Date/Time    Platelet count [[id number]]     Lab Status: No result Specimen: Blood                  MRI Inpatient Order    (Results Pending)              Procedures  Procedures         ED Course                               SBIRT 20yo+      Most Recent Value   SBIRT (22 yo +)    In order to provide better care to our patients, we are screening all of our patients for alcohol and drug use  Would it be okay to ask you these screening questions? Yes Filed at: 04/02/2022 1722   Initial Alcohol Screen: US AUDIT-C     1  How often do you have a drink containing alcohol? 0 Filed at: 04/02/2022 1722   2  How many drinks containing alcohol do you have on a typical day you are drinking? 0 Filed at: 04/02/2022 1722   3a  Male UNDER 65: How often do you have five or more drinks on one occasion?  0 Filed at: 04/02/2022 1722   3b  FEMALE Any Age, or MALE 65+: How often do you have 4 or more drinks on one occassion? 0 Filed at: 04/02/2022 1722   Audit-C Score 0 Filed at: 04/02/2022 1722   NELSON: How many times in the past year have you    Used an illegal drug or used a prescription medication for non-medical reasons? Never Filed at: 04/02/2022 1722                    MDM    Disposition  Final diagnoses:   TIA (transient ischemic attack)     Time reflects when diagnosis was documented in both MDM as applicable and the Disposition within this note     Time User Action Codes Description Comment    4/2/2022  5:52 PM Marie Blanc Add [G45 9] TIA (transient ischemic attack)     4/2/2022  6:56 PM Bennett Hills Add [H54 62] Vision loss of left eye       ED Disposition     ED Disposition Condition Date/Time Comment    Admit Stable Sat Apr 2, 2022  6:08 PM Case was discussed with Dr Raynard Gilford and the patient's admission status was agreed to be Admission Status: observation status to the service of Dr Raynard Gilford   Follow-up Information    None         Patient's Medications   Discharge Prescriptions    No medications on file       No discharge procedures on file      PDMP Review     None          ED Provider  Electronically Signed by           Dani Lay MD  04/02/22 4756

## 2022-04-02 NOTE — DISCHARGE INSTRUCTIONS
Please return to the ER as soon as possible    If you decide not to return please make sure to follow-up with all the specialists, Neurology, Ophthalmology and family doctor

## 2022-04-03 ENCOUNTER — APPOINTMENT (OUTPATIENT)
Dept: NON INVASIVE DIAGNOSTICS | Facility: HOSPITAL | Age: 67
End: 2022-04-03
Payer: MEDICARE

## 2022-04-03 VITALS
DIASTOLIC BLOOD PRESSURE: 73 MMHG | SYSTOLIC BLOOD PRESSURE: 143 MMHG | RESPIRATION RATE: 16 BRPM | BODY MASS INDEX: 27.26 KG/M2 | WEIGHT: 148.15 LBS | OXYGEN SATURATION: 97 % | TEMPERATURE: 98.1 F | HEIGHT: 62 IN | HEART RATE: 55 BPM

## 2022-04-03 PROBLEM — H53.9 VISION DISTURBANCE: Status: ACTIVE | Noted: 2022-04-02

## 2022-04-03 LAB
ALBUMIN SERPL BCP-MCNC: 3.5 G/DL (ref 3.5–5)
ALP SERPL-CCNC: 70 U/L (ref 46–116)
ALT SERPL W P-5'-P-CCNC: 21 U/L (ref 12–78)
ANION GAP SERPL CALCULATED.3IONS-SCNC: 10 MMOL/L (ref 4–13)
AST SERPL W P-5'-P-CCNC: 15 U/L (ref 5–45)
ATRIAL RATE: 65 BPM
BILIRUB SERPL-MCNC: 0.32 MG/DL (ref 0.2–1)
BUN SERPL-MCNC: 8 MG/DL (ref 5–25)
CALCIUM SERPL-MCNC: 8.8 MG/DL (ref 8.3–10.1)
CHLORIDE SERPL-SCNC: 105 MMOL/L (ref 100–108)
CHOLEST SERPL-MCNC: 186 MG/DL
CO2 SERPL-SCNC: 28 MMOL/L (ref 21–32)
CREAT SERPL-MCNC: 0.82 MG/DL (ref 0.6–1.3)
EST. AVERAGE GLUCOSE BLD GHB EST-MCNC: 117 MG/DL
GFR SERPL CREATININE-BSD FRML MDRD: 74 ML/MIN/1.73SQ M
GLUCOSE P FAST SERPL-MCNC: 92 MG/DL (ref 65–99)
GLUCOSE SERPL-MCNC: 92 MG/DL (ref 65–140)
HBA1C MFR BLD: 5.7 %
HDLC SERPL-MCNC: 68 MG/DL
LDLC SERPL CALC-MCNC: 107 MG/DL (ref 0–100)
P AXIS: 53 DEGREES
POTASSIUM SERPL-SCNC: 3.8 MMOL/L (ref 3.5–5.3)
PR INTERVAL: 146 MS
PROT SERPL-MCNC: 6.7 G/DL (ref 6.4–8.2)
QRS AXIS: 13 DEGREES
QRSD INTERVAL: 78 MS
QT INTERVAL: 408 MS
QTC INTERVAL: 424 MS
SODIUM SERPL-SCNC: 143 MMOL/L (ref 136–145)
T WAVE AXIS: 38 DEGREES
TRIGL SERPL-MCNC: 53 MG/DL
VENTRICULAR RATE: 65 BPM

## 2022-04-03 PROCEDURE — 36415 COLL VENOUS BLD VENIPUNCTURE: CPT

## 2022-04-03 PROCEDURE — 83036 HEMOGLOBIN GLYCOSYLATED A1C: CPT

## 2022-04-03 PROCEDURE — 99217 PR OBSERVATION CARE DISCHARGE MANAGEMENT: CPT | Performed by: INTERNAL MEDICINE

## 2022-04-03 PROCEDURE — 99204 OFFICE O/P NEW MOD 45 MIN: CPT | Performed by: PSYCHIATRY & NEUROLOGY

## 2022-04-03 PROCEDURE — 80053 COMPREHEN METABOLIC PANEL: CPT

## 2022-04-03 PROCEDURE — 80061 LIPID PANEL: CPT

## 2022-04-03 PROCEDURE — 93010 ELECTROCARDIOGRAM REPORT: CPT | Performed by: INTERNAL MEDICINE

## 2022-04-03 RX ORDER — ATORVASTATIN CALCIUM 40 MG/1
40 TABLET, FILM COATED ORAL
Qty: 30 TABLET | Refills: 0 | Status: SHIPPED | OUTPATIENT
Start: 2022-04-03 | End: 2022-05-03

## 2022-04-03 RX ORDER — CLOPIDOGREL BISULFATE 75 MG/1
75 TABLET ORAL DAILY
Qty: 7 TABLET | Refills: 0 | Status: SHIPPED | OUTPATIENT
Start: 2022-04-04 | End: 2022-04-11

## 2022-04-03 RX ADMIN — ENOXAPARIN SODIUM 40 MG: 40 INJECTION SUBCUTANEOUS at 09:09

## 2022-04-03 RX ADMIN — Medication 5000 UNITS: at 09:09

## 2022-04-03 RX ADMIN — CITALOPRAM HYDROBROMIDE 20 MG: 20 TABLET ORAL at 09:09

## 2022-04-03 RX ADMIN — CLOPIDOGREL BISULFATE 75 MG: 75 TABLET ORAL at 09:09

## 2022-04-03 RX ADMIN — ASPIRIN 81 MG: 81 TABLET, CHEWABLE ORAL at 09:09

## 2022-04-03 RX ADMIN — LEVOTHYROXINE SODIUM 88 MCG: 88 TABLET ORAL at 05:04

## 2022-04-03 NOTE — ASSESSMENT & PLAN NOTE
Patient with past medical history significant for brain aneurism s/p coiling, hypothyroidism and smoking had several intermitant episodes of left-sided hemianopsia yesterday that resolved spontaneously after lasting "a few minutes' each per patient  Patient also reports two additional similar episodes today  Of note, patient reports that she recently discontinued home ASA in setting of bone marrow biopsy scheduled for this Monday  Currently without deficits and denying symptoms  Neurology saw patient in ED  Recommend stroke pathway  Workup:   Blood Pressure: 143/73  · EKG on presentation to the ER showed sinus bradycardia     CTA head and neck with and without contrast :  No acute intracranial hemorrhage  No hemodynamically significant stenosis  · ABCD Score: 1    Plan - Stroke pathway:  · No significant events were noted on telemetry     · Atorvastatin 40 mg q d , Plavix 75 mg daily for 7 days, that only aspirin 325  · Neurology was consulted  · PT/OT/Speech eval - no further recommendations

## 2022-04-03 NOTE — PHYSICAL THERAPY NOTE
PHYSICAL THERAPY NOTE  Patient Name: Sushma Pennington  DVQPH'[id number] Date: 4/3/2022     04/03/22 1045   PT Last Visit   PT Visit Date 04/03/22   Note Type   Note type Screen   Additional Comments Pt to 77year old female transferred up from the ED, now on S 3  Patient reports no further limitations with vision at this time, denies any other deficits including but not limited to strength, sensation, balance  Patient states she is able to ambulate to and from the bathroom without any physical assist nor loss of balance  This confirms nursing reports  Patient denies need for IP PT consult at this time  Please re-consult with any changes in patient's mobility status

## 2022-04-03 NOTE — DISCHARGE SUMMARY
[facility]  Discharge- Evy Ayah 1955, 77 y o  female MRN: [id number]  Unit/Bed#: S -01 Encounter: [id number]  Primary Care Provider: Ela Steve MD   Date and time admitted to hospital: 4/2/2022  5:22 PM    * Intermittent left eye vision loss  Assessment & Plan  Patient with past medical history significant for brain aneurism s/p coiling, hypothyroidism and smoking had several intermitant episodes of left-sided hemianopsia yesterday that resolved spontaneously after lasting "a few minutes' each per patient  Patient also reports two additional similar episodes today  Of note, patient reports that she recently discontinued home ASA in setting of bone marrow biopsy scheduled for this Monday  Currently without deficits and denying symptoms  Neurology saw patient in ED  Recommend stroke pathway  Workup:   Blood Pressure: 143/73  · EKG on presentation to the ER showed sinus bradycardia     CTA head and neck with and without contrast :  No acute intracranial hemorrhage  No hemodynamically significant stenosis  · ABCD Score: 1    Plan - Stroke pathway:  · No significant events were noted on telemetry     · Atorvastatin 40 mg q d , Plavix 75 mg daily for 7 days, that only aspirin 325  · Neurology was consulted  · PT/OT/Speech eval - no further recommendations      Hypothyroidism  Assessment & Plan  · Continue home levothyroxine regimen     Monoclonal gammopathy  Assessment & Plan  · Patient reports she was scheduled for bone marrow biopsey this Monday   · Consider heme/onc consult   · Will require further workup outpatient    Lumbar spondylosis  Assessment & Plan  · Continue home flexeril        Medical Problems                 Discharging Resident: Ifrah Bhandari MD  Discharging Attending: No att  providers found  PCP: Ela Steve MD  Admission Date:   Admission Orders (From admission, onward)     Ordered        04/02/22 1808  Place in Observation  Once Discharge Date: 04/03/22    Consultations During Hospital Stay:  · Neurology    Procedures Performed:   · None    Significant Findings / Test Results:   CTA head and neck with and without contrast    Result Date: 4/2/2022  Impression: 1  Stable pipeline stent with coil embolization in the left parasellar region  No residual aneurysmal flow by CTA technique  No definite significant coil impaction by CTA  2   No acute intracranial hemorrhage  3   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, a developmental variant with mild narrowing at the origin, stable  No hemodynamically significant stenosis in the major arteries of the neck  4   4 mm groundglass nodule in the right lung not definitely identified previously  Differential considerations include infectious, inflammatory or neoplastic etiologies  Recommend follow-up repeat CT of the chest 3 months to assess for resolution  Workstation performed: [id number]       · No Chest XR results available for this patient  Results from last 7 days   Lab Units 04/03/22  0501 04/02/22  1251   SODIUM mmol/L 143 140   POTASSIUM mmol/L 3 8 3 8   CHLORIDE mmol/L 105 104   CO2 mmol/L 28 26   ANION GAP mmol/L 10 10   BUN mg/dL 8 10   CREATININE mg/dL 0 82 0 86   CALCIUM mg/dL 8 8 9 0   ALK PHOS U/L 70 89   ALT U/L 21 24   AST U/L 15 19         Incidental Findings:   4 mm groundglass nodule in the right lung not definitely identified previously  Differential considerations include infectious, inflammatory or neoplastic etiologies  Recommend follow-up repeat CT of the chest 3 months to assess for resolution  Test Results Pending at Discharge (will require follow up):   · None     Outpatient Tests Requested:  · None    Complications:  None    Reason for Admission:  Intermittent vision loss    Hospital Course:   Rubina Mcdermott is a 77 y o  female patient with prior history of brain aneurysm s/p coiling, hypothyroidism, mom and meningioma who originally presented to the hospital on 4/2/2022 due to intermittent episodes of visual loss  She was recently discontinued her home aspirin the setting of planned bone marrow biopsy for monoclonal gammopathy, planned for 04/04/2022  She was previously on Plavix which was stopped by her neurosurgeon  Patient on ED had a stable CTA head and neck, was given and Plavix load 300 mg also was started on aspirin  In ED she did not have any neurological deficiency  Neurology was consulted and evaluated the patient  Given patient has been back to baseline and was restarted or anti-platelet therapy, neurology believed that there is no need for further MRI brain at this time  She was also started on statin therapy, no further neurological recommendations were given, patient does not need to follow up with Neurology  Was recommended to follow-up with her neurosurgeon, she will take Plavix for 7 days 75 mg and 325 mg aspirin, she will continue 325 mg once Plavix is stopped  Patient was also evaluated by PT OT and speech, with no further recommendations, and normal examination  Patient was agreeable for discharge, was deemed fit for discharge and was discharged home  Please see above list of diagnoses and related plan for additional information  Condition at Discharge: good    Discharge Day Visit / Exam:   Subjective:  Patient feels good this morning denies any vision problems  Denies any chest pain or shortness of breath, denies any  Headache or double vision  No other complaints    Vitals: Blood Pressure: 143/73 (04/03/22 1100)  Pulse: 55 (04/03/22 1100)  Temperature: 98 1 °F (36 7 °C) (04/03/22 1100)  Temp Source: Oral (04/03/22 1100)  Respirations: 16 (04/03/22 1100)  Height: 5' 2" (157 5 cm) (04/02/22 1720)  Weight - Scale: 67 2 kg (148 lb 2 4 oz) (04/02/22 1720)  SpO2: 97 % (04/03/22 0905)  Exam:   Physical Exam  Vitals and nursing note reviewed     Constitutional:       General: She is not in acute distress  Appearance: She is well-developed  She is not diaphoretic  Interventions: She is not intubated  HENT:      Head: Normocephalic and atraumatic  Nose: Nose normal    Eyes:      General: No scleral icterus  Conjunctiva/sclera: Conjunctivae normal    Neck:      Trachea: No tracheal deviation  Cardiovascular:      Rate and Rhythm: Normal rate and regular rhythm  Pulses: Normal pulses  Radial pulses are 2+ on the right side and 2+ on the left side  Dorsalis pedis pulses are 2+ on the right side and 2+ on the left side  Heart sounds: Normal heart sounds, S1 normal and S2 normal  No murmur heard  No friction rub  No gallop  Pulmonary:      Effort: Pulmonary effort is normal  No respiratory distress  She is not intubated  Breath sounds: Normal breath sounds  No stridor  No decreased breath sounds, wheezing or rales  Chest:      Chest wall: No tenderness  Abdominal:      General: Bowel sounds are normal  There is no distension  Palpations: Abdomen is soft  There is no mass  Tenderness: There is no abdominal tenderness  Musculoskeletal:         General: No tenderness or deformity  Lymphadenopathy:      Cervical: No cervical adenopathy  Skin:     General: Skin is warm and dry  Coloration: Skin is not pale  Findings: No erythema  Neurological:      Mental Status: She is alert and oriented to person, place, and time  Psychiatric:         Speech: Speech normal          Behavior: Behavior normal          Thought Content: Thought content normal          Judgment: Judgment normal           Discussion with Family: Patient declined call to   Discharge instructions/Information to patient and family:   See after visit summary for information provided to patient and family        Provisions for Follow-Up Care:  See after visit summary for information related to follow-up care and any pertinent home health orders  Disposition:   Home    Planned Readmission:  None    Discharge Medications:  See after visit summary for reconciled discharge medications provided to patient and/or family        **Please Note: This note may have been constructed using a voice recognition system**

## 2022-04-03 NOTE — PLAN OF CARE
Problem: NEUROSENSORY - ADULT  Goal: Achieves stable or improved neurological status  Description: INTERVENTIONS  - Monitor and report changes in neurological status  - Monitor vital signs such as temperature, blood pressure, glucose, and any other labs ordered   - Initiate measures to prevent increased intracranial pressure  - Monitor for seizure activity and implement precautions if appropriate      Outcome: Progressing  Goal: Remains free of injury related to seizures activity  Description: INTERVENTIONS  - Maintain airway, patient safety  and administer oxygen as ordered  - Monitor patient for seizure activity, document and report duration and description of seizure to physician/advanced practitioner  - If seizure occurs,  ensure patient safety during seizure  - Reorient patient post seizure  - Seizure pads on all 4 side rails  - Instruct patient/family to notify RN of any seizure activity including if an aura is experienced  - Instruct patient/family to call for assistance with activity based on nursing assessment  - Administer anti-seizure medications if ordered    Outcome: Progressing  Goal: Achieves maximal functionality and self care  Description: INTERVENTIONS  - Monitor swallowing and airway patency with patient fatigue and changes in neurological status  - Encourage and assist patient to increase activity and self care     - Encourage visually impaired, hearing impaired and aphasic patients to use assistive/communication devices  Outcome: Progressing     Problem: CARDIOVASCULAR - ADULT  Goal: Maintains optimal cardiac output and hemodynamic stability  Description: INTERVENTIONS:  - Monitor I/O, vital signs and rhythm  - Monitor for S/S and trends of decreased cardiac output  - Administer and titrate ordered vasoactive medications to optimize hemodynamic stability  - Assess quality of pulses, skin color and temperature  - Assess for signs of decreased coronary artery perfusion  - Instruct patient to report change in severity of symptoms  Outcome: Progressing  Goal: Absence of cardiac dysrhythmias or at baseline rhythm  Description: INTERVENTIONS:  - Continuous cardiac monitoring, vital signs, obtain 12 lead EKG if ordered  - Administer antiarrhythmic and heart rate control medications as ordered  - Monitor electrolytes and administer replacement therapy as ordered  Outcome: Progressing

## 2022-04-03 NOTE — ASSESSMENT & PLAN NOTE
Assessment:  Juve Cueto is a 77 y o  female  pertinent history of prior brain aneurysm status post coiling, (3/27/2019 patient had a coil) (11/2021 -> patient had a stent) Meningioma : 5/11/2018-> 7 mm right anterior frontal, hypothyroidism, meningioma, initially presented on 04/02/2022 with intermittent episodes of vision loss  Patient recently discontinued her home aspirin in the setting of a planned bone marrow biopsy for monoclonal gammopathy  Patient on arrival to emergency department had a stable CTA head and neck  Was loaded with aspirin Plavix was started on dual antiplatelet therapy  Patient had stopped her aspirin on Wednesday prior to obtaining her bone marrow biopsy  Currently patient has a nonfocal nonlateralizing exam     Impression:  Possible amaurosis fugax given intermittent symptoms in her left eye  Given patient has been back to baseline, does not have any return of symptoms since being placed on dual antiplatelet therapy at this point can hold off on MRI brain  Patient was counseled that if she does have return of symptoms at that point she does need to come back to the emergency department and would consider obtaining an MR brain at that time  And patient was loaded with dual antiplatelet therapy patient was advised to follow-up with her neurosurgeon regarding dose of aspirin however at this point would recommend discharging patient on aspirin 325 mg and Plavix 75 mg for 1 week and then continue monotherapy with aspirin 325  Plan:  · Recommend continuing dual antiplatelet therapy with Plavix 75 mg and aspirin 325 mg for 1 week and then continue monotherapy with aspirin 325  · Can continue statin  · Can defer MRI brain at this time  · No further inpatient neuro recommendations  · Recommend outpatient follow-up with her neurosurgeon  [facility] MEDICAL      PATIENT INFORMATION SHEET: 15 MONTH WELL-CHILD EXAM      Malorie  12/16/2021    Temperature 97.5 °F (36.4 °C), temperature source Temporal, height 30\" (76.2 cm), weight 10.9 kg (23 lb 14.7 oz), head circumference 48 cm (18.9\").    Return for influenza vaccine     NUTRITION:  • 2% or whole milk for brain development for first 2 years  o Limit to 16-24 oz per day  o Limit juice to 4-6 oz per day; may dilute  • May also continue breastfeeding or providing breast milk  • Watch for choking hazards such as:  nuts, berries, sliced hot dogs, peanut butter, gum  • Anticipate the “picky” eater stage as growth slows  • Give your child one dropper of Poly-Vi-Sol with iron daily    GROWTH AND DEVELOPMENT:  Your child should be able to:  • Stand, stoop, and walk alone, may crawl up stairs, say 4-6 words--mostly ”jargon,” follow a simple command, point for things, give hugs, feed self    ANTICIPATORY GUIDANCE:  • Running; may start to climb stairs with help  • Pretend play  • Reading and sibling play is important--develops words and language  • Age appropriate toys:  puzzles, blocks, “Pat-a-cake” and “Peek-a-jaramillo”  • Develop a routine (eating, napping, bathing, bedtime); be consistent  • No toilet-training yet  • Discipline:  o Ignore unwanted behavior; remove and re-direct from situation  o No need to scold unless truly harmful situation  o NEVER hit your child  o Praise desired behavior; be consistent    SAFETY:  • Back to sleep; firm mattress; no pillows or blankets; crib slots < 2 ? in  • Try to keep room temp between 68-74° F for comfort  • Car seat must be rear-facing in back seat until 2 years of age  (www.carseat.org )  • Never leave infant alone or unsupervised; watch for choking hazards (small toys)  o Monitor areas of standing water such as buckets, toilet/tub, pools  o Watch for dangling cords or hot liquid that can be pulled off shelf or spilled while carrying your infant  o Outlet covers,  childproof cabinets (keep medication and solvents out of reach), and gate stairs  o Do not store household /solvents in food containers (Example: anti-freeze in a Gatorade bottle)  • Have working smoke detectors;  Place fire extinguishers in your home (bedroom, kitchen, basement); have a fire ladder on 2nd story and rescue plan in place  • Water heater should be set less than 120° F  • Sunscreen with SPF 15 or greater may be used (do not use sunscreen/insect repellant combo); apply 30 minutes prior to going outside; hats are encouraged  • Insect repellant with less than 10% DEET okay once or twice per day (apply mostly to shoes and clothes; avoid hands and eyes/face)  • Flying with your infant is okay; no special precautions needed (see http://www.aap.org/advocacy/releases/travelsafetytips.cfm for details)  • Avoid exposing Malorie to cigarette smoke due to increased risk for infections    HEALTH:  • Immunizations:  • See Handouts or WIR (https://www.dhfswir.org/HI/portalHeader.do) for more information  o After vaccination your infant may experience:  - Irritability, redness or lump at injection site, fever  • Fever:  o Temperature of 101° F or greater is a fever  o Assess for specific symptoms & signs of dehydration; call your doctor if needed (See fever-reducer dosing below)  • Teething:  o Increased drooling, fussiness  o Wipe gums/teeth with washcloth (no toothpaste)  o Fluoride supplements only if you live in area with low fluoride, have well water, or use bottled water (Ask your doctor)  • Blood will be drawn today for lead/iron testing      FEVER REDUCERS:  INFANT and CHILDREN'S (SAME DOSE) Tylenol/Acetaminophen  (160 mg/5 mL)  Child’s Weight: Dose:  12 - 17 pounds:   80 mg (2.5 mL (1/2 Teaspoon))  18 - 23 pounds:   120 mg (3.75 mL (3/4 Teaspoon))  24 - 36 pounds:     160 mg (5 mL (1 Teaspoon))    Beginning at 6 months of age, Children's Ibuprofen (e.g., Advil or Motrin) may be given every 6 hours as  needed for pain or fever.    INFANT Ibuprofen liquid (40 mg/mL)  Child’s Weight: Dose:  12 - 17 pounds:   50 mg (1.25 mL)  18 - 23 pounds:   75 mg (1.875 mL)  24 - 36 pounds:     100 mg (2.5 mL)    CHILDREN'S Ibuprofen liquid (100 mg/5 mL)  Child’s Weight: Dose:  12 - 17 pounds:   50 mg (2.5 mL (1/2 Teaspoon))  18 - 23 pounds: 75 mg (3.75 mL (3/4 Teaspoon))  24 - 36 pounds:   100 mg (5 mL (1 Teaspoon))      HELPFUL RESOURCES:  • APPOINTMENTS:  Call Central Scheduling at [phone]  • AFTER HOURS (for urgent matters that cannot wait until the following business day):  Call your doctor’s office phone number and you will be transferred to the answering service.  Your call will be returned by the pediatrician who is on call for your doctor.  • Important Phone Numbers:  o Parent Helpline:  [phone]  o Poison Control:  1-580.723.5764  • Websites:  o American Academy of Pediatrics website:  www.aap.org  • Books (found at www.aap.org or in bookstores)  o Caring for your Baby and Young Child by Yong Hawkins MD        NEXT CHECK-UP:  18 MONTHS

## 2022-04-03 NOTE — DISCHARGE INSTR - AVS FIRST PAGE
Dear Chino Gee,     It was our pleasure to care for you here at [facility]  It is our hope that we were always able to exceed the expected standards for your care during your stay  You were hospitalized due several episodes of temporary vision loss  You were cared for on the 3rd floor by Ranjit Cisneros MD under the service of Luciano East MD with the [facility] who covers for your primary care physician (PCP), Charly Meadows MD, while you were hospitalized  If you have any questions or concerns related to this hospitalization, you may contact us at [phone]  For follow up as well as any medication refills, we recommend that you follow up with your primary care physician  A registered nurse will reach out to you by phone within a few days after your discharge to answer any additional questions that you may have after going home  However, at this time we provide for you here, the most important instructions / recommendations at discharge:     Notable Medication Adjustments -   Please start Plavix 75 mg daily for 7 days  Please start aspirin 325 mg daily   Please start atorvastatin 40 mg daily  Please continue your home medication as prior to admission  Testing Required after Discharge -   No testing required  Important follow up information -   Please follow-up with your primary care provider for further evaluation and transition of care  Please follow-up with Neurosurgeon for further evaluation  Other Instructions -   Please call 911 or go to the nearest emergency department if you suddenly developed visual loss which does not improve, nausea or vomiting with headaches which is not improving with any symptomatic treatment     Please review this entire after visit summary as additional general instructions including medication list, appointments, activity, diet, any pertinent wound care, and other additional recommendations from your care team that may be provided for you        Sincerely,     Marylee Parody, MD

## 2022-04-03 NOTE — SPEECH THERAPY NOTE
Consult received and chart reviewed  Per chart review, and discussion with RN, pt passed RN dysphagia assessment and is tolerating regular diet with thin liquids with no s/s of aspiration  No dysarthria or language deficits noted or reported  Therefore, formal speech/swallow evaluation not indicated at this time  If new concerns arise, please reconsult       CATHERINE Vargas S , [address] [facility], [place], operated by [facility]  Speech Language Pathologist   Available via [address] [place] [address] #[id number]  [place] #[id number]

## 2022-04-03 NOTE — CONSULTS
NEUROLOGY RESIDENCY CONSULT NOTE     Name: Juve Cueto   Age & Sex: 77 y o  female   MRN: [id number]  Unit/Bed#: S -01   Encounter: [id number]  Length of Stay: 0    ASSESSMENT & PLAN     * Intermittent left eye vision loss  Assessment & Plan  Assessment:  Juve Cueto is a 77 y o  female  pertinent history of prior brain aneurysm status post coiling, (3/27/2019 patient had a coil) (11/2021 -> patient had a stent) Meningioma : 5/11/2018-> 7 mm right anterior frontal, hypothyroidism, meningioma, initially presented on 04/02/2022 with intermittent episodes of vision loss  Patient recently discontinued her home aspirin in the setting of a planned bone marrow biopsy for monoclonal gammopathy  Patient on arrival to emergency department had a stable CTA head and neck  Was loaded with aspirin Plavix was started on dual antiplatelet therapy  Patient had stopped her aspirin on Wednesday prior to obtaining her bone marrow biopsy  Currently patient has a nonfocal nonlateralizing exam     Impression:  Possible amaurosis fugax given intermittent symptoms in her left eye  Given patient has been back to baseline, does not have any return of symptoms since being placed on dual antiplatelet therapy at this point can hold off on MRI brain  Patient was counseled that if she does have return of symptoms at that point she does need to come back to the emergency department and would consider obtaining an MR brain at that time  And patient was loaded with dual antiplatelet therapy patient was advised to follow-up with her neurosurgeon regarding dose of aspirin however at this point would recommend discharging patient on aspirin 325 mg and Plavix 75 mg for 1 week and then continue monotherapy with aspirin 325      Plan:  · Recommend continuing dual antiplatelet therapy with Plavix 75 mg and aspirin 325 mg for 1 week and then continue monotherapy with aspirin 325  · Can continue statin  · Can defer MRI brain at this time  · No further inpatient neuro recommendations  · Recommend outpatient follow-up with her neurosurgeon  SUBJECTIVE     Reason for Consult / Principal Problem: Intermittent Vision Loss   Hx and PE limited by: none    HPI: Emilee Vizcarra is a 77 y o   female with a pertinent history of prior brain aneurysm status post coiling, (3/27/2019 patient had a coil) (11/2021 -> patient had a stent)   Meningioma : 5/11/2018-> 7 mm right anterior frontal, hypothyroidism, meningioma, initially presented on 04/02/2022 with intermittent episodes of vision loss  Patient recently discontinued her home aspirin in the setting of a planned bone marrow biopsy for monoclonal gammopathy  On arrival to the emergency department initial blood pressure was 116/57, remaining vital signs were stable  Initial labs consisting of CBC and CMP were within normal limits  CTA head and neck showed a stable stent with coil embolization in the left parasellar region  No acute intracranial hemorrhage  In the emergency department patient was loaded with a full-dose aspirin and Plavix and started on dual antiplatelet therapy with aspirin 81 and Plavix 75  To review, patient reports that over the last few days she has had about 5 episodes of vision loss in her left eye for a couple minutes at a time  She describes the vision loss as a curtain coming down like a window shade from the top of her I had a bottom  On another episode she reports it seem like the current 1 from the left side of the eye to the right side of the eye  Patient reports after the symptoms her vision returns completely back to normal   And she also described 1 episode of vision loss versus consistent with tunnel vision  Patient denies any associated headache, dizziness, dysarthria weakness numbness tingling  Currently she feels back to her baseline    Of note she was recently taken off her Plavix by her neurosurgeon about 2 weeks ago after she had a stable angiogram  Patient was taking aspirin up until Wednesday 2 days prior to her symptom onset for a planned bone marrow biopsy scheduled for Monday 04/04/2022  Patient reports she spoke to her neurosurgeon and he recommended going back on the aspirin given that her symptoms probably related  Under my evaluation patient reports that currently she is back at baseline, she had 5 episodes and had not had any return of symptoms since being started on antiplatelets  She at baseline was on aspirin 325 as recommended by her neurosurgeon  Patient was recommended to call her neurosurgeon just to confirm the dose which she reports she did call and he did recommend being on aspirin 325, patient's neuro exam is completely nonfocal and nonlateralizing  At this point can hold off on obtaining MRI brain  Recommend patient go home on aspirin 325 and Plavix 75 mg and continue this for 1 week and then continue with aspirin monotherapy 325       Inpatient consult to Neurology  Consult performed by: Charlene Ramirez MD  Consult ordered by: Isaias Marquez MD    Inpatient consult to Neurology  Consult performed by: Charlene Ramirez MD  Consult ordered by: Dani Lay MD          Historical Information   Past Medical History:   Diagnosis Date    Anxiety and depression     Brain aneurysm     Cerebral aneurysm without rupture     Disease of thyroid gland     H/O toxic shock syndrome     HPV (human papilloma virus) infection     Hypothyroidism     Meningioma ([address] )     Microhematuria     Monoclonal gammopathy     Osteoarthritis      Past Surgical History:   Procedure Laterality Date    AUGMENTATION BREAST      CEREBRAL ANEURYSM REPAIR      intracranial stent    CYSTOSCOPY      FOOT SURGERY Right     bunionectomy    HAND SURGERY Right     CTR    IR CEREBRAL ANEURYSM COILING      OOPHORECTOMY Right     SALPINGECTOMY       Social History   Social History     Substance and Sexual Activity   Alcohol Use Never     Social History Substance and Sexual Activity   Drug Use Yes    Types: Marijuana    Comment: medical marijuana     E-Cigarette/Vaping     E-Cigarette/Vaping Substances     Social History     Tobacco Use   Smoking Status Current Every Day Smoker    Packs/day: 1 00   Smokeless Tobacco Never Used     Family History:   Family History   Problem Relation Age of Onset    Heart failure Mother     Arthritis Mother     Hypothyroidism Mother     Hypertension Mother     Hyperlipidemia Mother     Stroke Mother     Lung cancer Father     Emphysema Father     Heart disease Brother      Meds/Allergies   all current active meds have been reviewed, current meds:   Current Facility-Administered Medications   Medication Dose Route Frequency    aspirin chewable tablet 81 mg  81 mg Oral Daily    atorvastatin (LIPITOR) tablet 40 mg  40 mg Oral Daily With Dinner    cholecalciferol (VITAMIN D3) tablet 5,000 Units  5,000 Units Oral Daily    citalopram (CeleXA) tablet 20 mg  20 mg Oral Daily    clopidogrel (PLAVIX) tablet 75 mg  75 mg Oral Daily    cyclobenzaprine (FLEXERIL) tablet 5 mg  5 mg Oral HS    enoxaparin (LOVENOX) subcutaneous injection 40 mg  40 mg Subcutaneous Daily    [START ON 4/4/2022] levothyroxine tablet 44 mcg  44 mcg Oral Once per day on Mon Wed Fri    levothyroxine tablet 88 mcg  88 mcg Oral Once per day on Sun Tue Thu Sat    melatonin tablet 10 5 mg  10 5 mg Oral HS    nicotine (NICODERM CQ) 7 mg/24hr TD 24 hr patch 1 patch  1 patch Transdermal Daily PRN    and PTA meds:   Prior to Admission Medications   Prescriptions Last Dose Informant Patient Reported? Taking? Biotin 1000 MCG tablet   Yes Yes   Sig: Take 1,000 mcg by mouth daily     Cholecalciferol (Vitamin D) 125 MCG (5000 UT) CAPS   Yes Yes   Sig: Take by mouth   Levothyroxine Sodium 88 MCG CAPS  Self Yes Yes   Sig: Take 88 mcg by mouth daily     Melatonin 10 MG TABS   Yes Yes   Sig: Take 10 mg by mouth daily at bedtime     aspirin 325 mg tablet  Self Yes Yes   Sig: Take 325 mg by mouth daily   citalopram (CeleXA) 20 mg tablet  Self Yes Yes   Sig: Take 20 mg by mouth daily   clopidogrel (PLAVIX) 75 mg tablet  Self Yes Yes   Sig: Take 75 mg by mouth daily   cyclobenzaprine (FLEXERIL) 5 mg tablet   Yes Yes   Sig: TAKE 1 TABLET BY MOUTH NIGHTLY AS NEEDED FOR MUSCLE SPASMS   levothyroxine 88 mcg tablet   Yes Yes   Si 1/2 TAB ON  AND 1 TAB ON ALL OTHER DAYS   zinc gluconate 50 mg tablet   Yes Yes   Sig: Take 50 mg by mouth daily   zinc sulfate (ZINCATE) 220 mg capsule   Yes Yes   Sig: Take 50 mg by mouth daily        Facility-Administered Medications: None     No Known Allergies    Review of previous medical records was  completed  Review of Systems   Constitutional: Negative for chills and fever  HENT: Negative for ear pain and sore throat  Eyes: Negative for photophobia, pain and visual disturbance  Respiratory: Negative for cough and shortness of breath  Cardiovascular: Negative for chest pain and palpitations  Gastrointestinal: Negative for abdominal pain and vomiting  Genitourinary: Negative for dysuria and hematuria  Musculoskeletal: Negative for arthralgias and back pain  Skin: Negative for color change and rash  Neurological: Negative for dizziness, tremors, seizures, syncope, facial asymmetry, speech difficulty, weakness, light-headedness, numbness and headaches  All other systems reviewed and are negative  OBJECTIVE     Patient ID: Erendira Jaime is a 77 y o  female  Vitals:   Vitals:    22 0800 22 0830 22 0905 22 1100   BP: 128/64  132/69 143/73   BP Location:   Right arm Right arm   Pulse: 66 58 55 55   Resp:   16 16   Temp:   98 °F (36 7 °C) 98 1 °F (36 7 °C)   TempSrc:   Oral Oral   SpO2: 91% 94% 97%    Weight:       Height:          Body mass index is 27 1 kg/m²       Intake/Output Summary (Last 24 hours) at 4/3/2022 1240  Last data filed at 4/3/2022 0905  Gross per 24 hour   Intake 240 ml Output --   Net 240 ml       Temperature:   Temp (24hrs), Av 2 °F (36 8 °C), Min:98 °F (36 7 °C), Max:98 5 °F (36 9 °C)    Temperature: 98 1 °F (36 7 °C)    Invasive Devices: Invasive Devices  Report    Peripheral Intravenous Line            Peripheral IV 22 Left Antecubital <1 day                Physical Exam  Vitals and nursing note reviewed  Constitutional:       General: She is not in acute distress  Appearance: She is well-developed  HENT:      Head: Normocephalic and atraumatic  Nose: Nose normal    Eyes:      Extraocular Movements: Extraocular movements intact  Pupils: Pupils are equal, round, and reactive to light  Pulmonary:      Effort: No respiratory distress  Breath sounds: Normal breath sounds  Musculoskeletal:         General: No tenderness or deformity  Cervical back: Normal range of motion  Skin:     General: Skin is warm and dry  Neurological:      Mental Status: She is alert and oriented to person, place, and time  Coordination: Finger-Nose-Finger Test and Heel to [place] jerardo Test normal       Deep Tendon Reflexes: Strength normal    Psychiatric:         Speech: Speech normal           Neurologic Exam     Mental Status   Oriented to person, place, and time  Follows 2 step commands  Attention: normal    Speech: speech is normal   Level of consciousness: alert  Able to name object  Able to repeat  Cranial Nerves   Cranial nerves II through XII intact  CN II   Visual fields full to confrontation  CN III, IV, VI   Pupils are equal, round, and reactive to light  Nystagmus: none   Diplopia: none  Conjugate gaze: present    CN V   Facial sensation intact       CN XI   CN XI normal      CN XII   CN XII normal    Tongue: not atrophic  Fasciculations: absent  Tongue deviation: none    Motor Exam   Muscle bulk: normal  Overall muscle tone: normal  Right arm pronator drift: absent  Left arm pronator drift: absent    Strength   Strength 5/5 throughout  Right deltoid: 5/5  Left deltoid: 5/5  Right biceps: 5/5  Left biceps: 5/5  Right triceps: 5/5  Left triceps: 5/5  Right hamstrin/5  Left hamstrin/5  Right peroneal: 5/5  Left peroneal: 5/5  Right gastroc: 5/5  Left gastroc: 5/5    Sensory Exam   Light touch normal      Gait, Coordination, and Reflexes     Coordination   Finger to nose coordination: normal  Heel to shin coordination: normal    Tremor   Resting tremor: absent  Intention tremor: absent  Action tremor: absent    Reflexes   Reflexes 2+ except as noted  Right plantar: normal  Left plantar: normal      LABORATORY DATA     Labs: I have personally reviewed pertinent reports  Results from last 7 days   Lab Units 22  1251   WBC Thousand/uL 5 32   HEMOGLOBIN g/dL 13 0   HEMATOCRIT % 40 1   PLATELETS Thousands/uL 150   NEUTROS PCT % 47   MONOS PCT % 16*      Results from last 7 days   Lab Units 22  0501 22  1251   POTASSIUM mmol/L 3 8 3 8   CHLORIDE mmol/L 105 104   CO2 mmol/L 28 26   BUN mg/dL 8 10   CREATININE mg/dL 0 82 0 86   CALCIUM mg/dL 8 8 9 0   ALK PHOS U/L 70 89   ALT U/L 21 24   AST U/L 15 19                            IMAGING & DIAGNOSTIC TESTING     Radiology Results: I have personally reviewed pertinent reports  No orders to display       Other Diagnostic Testing: I have personally reviewed pertinent reports        ACTIVE MEDICATIONS     Current Facility-Administered Medications   Medication Dose Route Frequency    aspirin chewable tablet 81 mg  81 mg Oral Daily    atorvastatin (LIPITOR) tablet 40 mg  40 mg Oral Daily With Dinner    cholecalciferol (VITAMIN D3) tablet 5,000 Units  5,000 Units Oral Daily    citalopram (CeleXA) tablet 20 mg  20 mg Oral Daily    clopidogrel (PLAVIX) tablet 75 mg  75 mg Oral Daily    cyclobenzaprine (FLEXERIL) tablet 5 mg  5 mg Oral HS    enoxaparin (LOVENOX) subcutaneous injection 40 mg  40 mg Subcutaneous Daily    [START ON 2022] levothyroxine tablet 44 mcg 44 mcg Oral Once per day on Mon Wed Fri    levothyroxine tablet 88 mcg  88 mcg Oral Once per day on Sun Tue Thu Sat    melatonin tablet 10 5 mg  10 5 mg Oral HS    nicotine (NICODERM CQ) 7 mg/24hr TD 24 hr patch 1 patch  1 patch Transdermal Daily PRN       Prior to Admission medications    Medication Sig Start Date End Date Taking? Authorizing Provider   aspirin 325 mg tablet Take 325 mg by mouth daily   Yes Historical Provider, MD   Biotin 1000 MCG tablet Take 1,000 mcg by mouth daily     Yes Historical Provider, MD   Cholecalciferol (Vitamin D) 125 MCG (5000 UT) CAPS Take by mouth   Yes Historical Provider, MD   citalopram (CeleXA) 20 mg tablet Take 20 mg by mouth daily   Yes Historical Provider, MD   clopidogrel (PLAVIX) 75 mg tablet Take 75 mg by mouth daily   Yes Historical Provider, MD   cyclobenzaprine (FLEXERIL) 5 mg tablet TAKE 1 TABLET BY MOUTH NIGHTLY AS NEEDED FOR MUSCLE SPASMS 1/6/22  Yes Historical Provider, MD   levothyroxine 88 mcg tablet 1 1/2 TAB ON M-W-F AND 1 TAB ON ALL OTHER DAYS 3/4/22  Yes Historical Provider, MD   Levothyroxine Sodium 88 MCG CAPS Take 88 mcg by mouth daily     Yes Historical Provider, MD   Melatonin 10 MG TABS Take 10 mg by mouth daily at bedtime     Yes Historical Provider, MD   zinc gluconate 50 mg tablet Take 50 mg by mouth daily   Yes Historical Provider, MD   zinc sulfate (ZINCATE) 220 mg capsule Take 50 mg by mouth daily     Yes Historical Provider, MD         CODE STATUS & ADVANCED DIRECTIVES     Code Status: Level 1 - Full Code  Advance Directive and Living Will:      Power of :    POLST:      3  ==  MD Khai Nevarez [facility] Neurology Residency, PGY-3

## 2022-06-15 ENCOUNTER — TELEPHONE (OUTPATIENT)
Dept: OTHER | Facility: OTHER | Age: 67
End: 2022-06-15

## 2022-06-15 NOTE — TELEPHONE ENCOUNTER
Dany Ordaz from pt's PCP's office is calling for info on this pt  She says Dr Tripp Iraheta was going to refer her for colorectal surgery, but hasn't heard anything since 2017  She is requesting a call back

## 2022-06-15 NOTE — TELEPHONE ENCOUNTER
I called and spoke to Richi and advised her that patient was seen in 2019 for a flex sig and was recommended to see Dr Garcia Life  We have not seen patient since then  Richi advised she would reach out to Dr [facility] office   Thank you

## 2022-10-19 ENCOUNTER — TELEPHONE (OUTPATIENT)
Dept: GASTROENTEROLOGY | Facility: CLINIC | Age: 67
End: 2022-10-19

## 2022-10-19 NOTE — TELEPHONE ENCOUNTER
Patients GI provider:  Dr Glenna Jones     Number to return call: ([phone]    Reason for call: Pt calling to see if she due for a colonoscopy  Pt stated Dr Glenna Jones performed colonoscopy back in 2017, but no records in chart  Offered to schedule f/u appt pt declined        Scheduled procedure/appointment date if applicable: n/a

## 2022-10-21 NOTE — TELEPHONE ENCOUNTER
Looked up Pt chart in [facility]  Last colonoscopy was 03/2019 with a 5 year recall recommendation  Pt Due in 2024, spoke to patient and advised

## 2023-10-16 ENCOUNTER — TELEPHONE (OUTPATIENT)
Dept: NEUROSURGERY | Facility: CLINIC | Age: 68
End: 2023-10-16

## 2023-10-16 NOTE — TELEPHONE ENCOUNTER
PT called to establish care with [facility]m wishes to become pt here, did self referral and she is waiting for intake

## 2023-10-30 ENCOUNTER — OFFICE VISIT (OUTPATIENT)
Dept: NEUROSURGERY | Facility: CLINIC | Age: 68
End: 2023-10-30
Payer: MEDICARE

## 2023-10-30 VITALS
BODY MASS INDEX: 24.58 KG/M2 | HEIGHT: 61 IN | WEIGHT: 130.2 LBS | DIASTOLIC BLOOD PRESSURE: 76 MMHG | HEART RATE: 64 BPM | OXYGEN SATURATION: 99 % | SYSTOLIC BLOOD PRESSURE: 124 MMHG | TEMPERATURE: 97.9 F | RESPIRATION RATE: 16 BRPM

## 2023-10-30 DIAGNOSIS — I67.1 ANEURYSM OF CAVERNOUS PORTION OF LEFT INTERNAL CAROTID ARTERY: Primary | ICD-10-CM

## 2023-10-30 PROCEDURE — 99203 OFFICE O/P NEW LOW 30 MIN: CPT | Performed by: PHYSICIAN ASSISTANT

## 2023-10-30 RX ORDER — TRAZODONE HYDROCHLORIDE 50 MG/1
50 TABLET ORAL
COMMUNITY
Start: 2023-09-13 | End: 2023-12-12

## 2023-10-30 RX ORDER — LORAZEPAM 0.5 MG/1
0.5 TABLET ORAL DAILY PRN
COMMUNITY
Start: 2023-10-27

## 2023-10-30 RX ORDER — CYCLOBENZAPRINE HCL 5 MG
TABLET ORAL AS NEEDED
COMMUNITY
Start: 2023-09-15

## 2023-10-30 NOTE — ASSESSMENT & PLAN NOTE
H/o cerebral aneurysm with prior treatment   S/p coil embolization of cerebra aneurysm 3/22/2019 with Dr. Yoav Dale  S/p pipeline embolization of cerebral aneurysm possibly 10/18/2021 with Dr. Yoav Dale although records from [facility] not directly available to review    Imaging:   MRA completed at [facility] 11/14/20[facility]: Susceptibility artifact along the medial aspect of the paraclinoid segment of the left ICA likely corresponding to patient's recent coiling and stenting. No flow related signal abnormality or enhancement to suggest new or recurrent aneurysm. Plan:   Monitor for symptoms  Patient without recent follow up with neurology- h/o amaurosis fugax in April 2022 during short period of time off AC/AP   Recommended by neurosurgical provider after hospitalization 4/2022 to transition to ASA monotherapy, but reports conversation after discharge, patient was told to continue with DAPT  Patient now >2 years s/p stenting and should not require DAPT  Will discuss with patient at follow up appointment possible ASA monotherapy   Will order MRA head at this time given approximately 1 year since last evaluation of cerebral aneurysm  MRA will provide better visualization of cerebral aneurysm with coil mass present compared to CTA. Will have patient return for joint appointment with endovascular attending and AP for follow up in [facility].  Dr. Nubia Conway aware of patient

## 2023-10-30 NOTE — PROGRESS NOTES
Neurosurgery Office Note  Gil Huber 76 y.o. female MRN: [id number]      Assessment/Plan     Aneurysm of cavernous portion of left internal carotid artery  H/o cerebral aneurysm with prior treatment   S/p coil embolization of cerebra aneurysm 3/22/2019 with Dr. Jaye Varela  S/p pipeline embolization of cerebral aneurysm possibly 10/18/2021 with Dr. Jaye Varela although records from [facility] not directly available to review    Imaging:   MRA completed at [facility] 11/14/2022: Susceptibility artifact along the medial aspect of the paraclinoid segment of the left ICA likely corresponding to patient's recent coiling and stenting. No flow related signal abnormality or enhancement to suggest new or recurrent aneurysm. Plan:   Monitor for symptoms  Patient without recent follow up with neurology- h/o amaurosis fugax in April 2022 during short period of time off AC/AP   Recommended by neurosurgical provider after hospitalization 4/2022 to transition to ASA monotherapy, but reports conversation after discharge, patient was told to continue with DAPT  Patient now >2 years s/p stenting and should not require DAPT  Will discuss with patient at follow up appointment possible ASA monotherapy   Will order MRA head at this time given approximately 1 year since last evaluation of cerebral aneurysm  MRA will provide better visualization of cerebral aneurysm with coil mass present compared to CTA. Will have patient return for joint appointment with endovascular attending and AP for follow up in [facility]. Dr. Carson Zazueta aware of patient      Diagnoses and all orders for this visit:    Aneurysm of cavernous portion of left internal carotid artery  -     Cancel: CTA head and neck w wo contrast; Future  -     MRA head w wo contrast; Future    Other orders  -     Multiple Vitamins-Minerals (WOMENS MULTIVITAMIN PO); Take 1 capsule by mouth daily  -     cyclobenzaprine (FLEXERIL) 5 mg tablet; if needed  -     LORazepam (ATIVAN) 0.5 mg tablet;  Take 0.5 mg by mouth daily as needed  -     traZODone (DESYREL) 50 mg tablet; Take 50 mg by mouth daily at bedtime          I have spent a total time of 30 minutes on 10/30/23 in caring for this patient including Diagnostic results, Prognosis, Instructions for management, Patient and family education, Risk factor reductions, Impressions, Counseling / Coordination of care, Documenting in the medical record, Reviewing / ordering tests, medicine, procedures  , Obtaining or reviewing history  , and Communicating with other healthcare professionals . CHIEF COMPLAINT    Chief Complaint   Patient presents with    New Patient Visit     HX OF ANEURYSM NO RECENT IMAGING       HISTORY    History of Present Illness     76y.o. year old female who presents to the outpatient neurosurgical office to establish care for history of cerebral aneurysm status post treatment. Patient suffered from a syncopal event in 2019. Her family doctor ordered an MRI of the brain which incidentally noted a cavernous cerebral aneurysm. Compared to prior MRI imaging this was reported to have enlarged since 2018 and was recommended treatment by Dr. Vero Maynard who was with [address] [place] [address] neurosurgery at the time. She underwent successful coil embolization of her aneurysm on 3/22/2019. Her established neurosurgeon moved and began practicing with our [facility] where she was seen in follow-up and recommended further treatment including pipeline embolization of her left cavernous ICA aneurysm presumably on 10/18/2021. Some records from care everywhere are available at this time although limited information is available. Patient does not remember the exact time of her treatment. She did have a CTA within the [facility] on 10/26/2019 which does show stent in place. Patient is a 10year-old female without family history of cerebral aneurysm. She is a daily smoker. She has not had any history of aneurysm rupture.   She was previously noted to have a 7 mm parafalcine meningioma in the past.  She denies any neurologic issues or complaints. She denies any symptoms. See Discussion    REVIEW OF SYSTEMS    Review of Systems   Gastrointestinal:  Negative for nausea and vomiting. Neurological: Negative. NEWPT HX OF ANEURYSM NO RECENT IMAGING    Pt to [facility] care  PT HAD PREVIOUS ANEURYSM, COILING 2019 AND  STENT PLACED [id number]     Pt c/o occasional Dizziness and some Neasea/vomitting     No family hx of aneurysm    Hematological:         Plavix and ASA    All other systems reviewed and are negative. ROS obtained by MA. Reviewed. See HPI.      Meds/Allergies     Current Outpatient Medications   Medication Sig Dispense Refill    aspirin 325 mg tablet Take 325 mg by mouth daily      atorvastatin (LIPITOR) 40 mg tablet Take 1 tablet (40 mg total) by mouth daily with dinner (Patient taking differently: Take 20 mg by mouth daily with dinner) 30 tablet 0    B Complex Vitamins (VITAMIN-B COMPLEX PO) Take by mouth      Calcium Carbonate-Vit D-Min (CALCIUM 1200 PO) Take by mouth      Cholecalciferol (Vitamin D) 125 MCG (5000 UT) CAPS Take by mouth      cyclobenzaprine (FLEXERIL) 5 mg tablet if needed      levothyroxine 112 mcg tablet Take 112 mcg by mouth daily      LORazepam (ATIVAN) 0.5 mg tablet Take 0.5 mg by mouth daily as needed      Multiple Vitamins-Minerals (WOMENS MULTIVITAMIN PO) Take 1 capsule by mouth daily      traZODone (DESYREL) 50 mg tablet Take 50 mg by mouth daily at bedtime      zinc gluconate 50 mg tablet Take 50 mg by mouth daily      Biotin 1000 MCG tablet Take 1,000 mcg by mouth daily        clopidogrel (PLAVIX) 75 mg tablet Take 1 tablet (75 mg total) by mouth daily for 7 days 7 tablet 0    Melatonin 10 MG TABS Take 10 mg by mouth daily at bedtime        zinc sulfate (ZINCATE) 220 mg capsule Take 50 mg by mouth daily   (Patient not taking: Reported on 8/29/2022)       No current facility-administered medications for this visit. No Known Allergies    PAST HISTORY    Past Medical History:   Diagnosis Date    Anxiety and depression     Brain aneurysm     Cerebral aneurysm without rupture     Disease of thyroid gland     H/O toxic shock syndrome     HPV (human papilloma virus) infection     Hypothyroidism     Meningioma (HCC)     Microhematuria     Monoclonal gammopathy     Osteoarthritis        Past Surgical History:   Procedure Laterality Date    AUGMENTATION BREAST      CEREBRAL ANEURYSM REPAIR      intracranial stent    CYSTOSCOPY      FOOT SURGERY Right     bunionectomy    HAND SURGERY Right     CTR    IR CEREBRAL ANEURYSM COILING      OOPHORECTOMY Right     SALPINGECTOMY         Social History     Tobacco Use    Smoking status: Every Day     Packs/day: 1.00     Types: Cigarettes    Smokeless tobacco: Never   Substance Use Topics    Alcohol use: Never    Drug use: Yes     Types: Marijuana     Comment: medical marijuana       Family History   Problem Relation Age of Onset    Heart failure Mother     Arthritis Mother     Hypothyroidism Mother     Hypertension Mother     Hyperlipidemia Mother     Stroke Mother     Lung cancer Father     Emphysema Father     Heart disease Brother          Above history personally reviewed. EXAM    Vitals:Blood pressure 124/76, pulse 64, temperature 97.9 °F (36.6 °C), temperature source Temporal, resp. rate 16, height 5' 1" (1.549 m), weight 59.1 kg (130 lb 3.2 oz), SpO2 99 %. ,Body mass index is 24.6 kg/m². Physical Exam  Constitutional:       Appearance: Normal appearance. She is well-developed. HENT:      Head: Normocephalic and atraumatic. Eyes:      Extraocular Movements: Extraocular movements intact and EOM normal.      Pupils: Pupils are equal, round, and reactive to light. Neck:      Vascular: No JVD. Trachea: No tracheal deviation. Cardiovascular:      Rate and Rhythm: Normal rate.    Pulmonary:      Effort: Pulmonary effort is normal. No respiratory distress. Musculoskeletal:         General: No deformity. Normal range of motion. Cervical back: Normal range of motion and neck supple. Skin:     General: Skin is warm and dry. Neurological:      Mental Status: She is alert and oriented to person, place, and time. Cranial Nerves: No cranial nerve deficit. Sensory: No sensory deficit. Motor: Motor strength is normal.No weakness. Gait: Gait is intact. Deep Tendon Reflexes: Reflexes are normal and symmetric. Reflex Scores:       Bicep reflexes are 2+ on the right side and 2+ on the left side. Brachioradialis reflexes are 2+ on the right side and 2+ on the left side. Patellar reflexes are 2+ on the right side and 2+ on the left side. Psychiatric:         Speech: Speech normal.         Behavior: Behavior normal.         Thought Content: Thought content normal.         Neurologic Exam     Mental Status   Oriented to person, place, and time. Attention: normal.   Speech: speech is normal   Level of consciousness: alert  Knowledge: good. Normal comprehension. Cranial Nerves     CN III, IV, VI   Pupils are equal, round, and reactive to light. Extraocular motions are normal.   Upgaze: normal  Downgaze: normal    CN VII   Facial expression full, symmetric. CN VIII   CN VIII normal.   Hearing: intact    Motor Exam   Muscle bulk: normal  Right arm tone: normal  Left arm tone: normal  Right leg tone: normal  Left leg tone: normal    Strength   Strength 5/5 throughout.      Sensory Exam   Light touch normal.     Gait, Coordination, and Reflexes     Gait  Gait: normal    Tremor   Resting tremor: absent  Action tremor: absent    Reflexes   Right brachioradialis: 2+  Left brachioradialis: 2+  Right biceps: 2+  Left biceps: 2+  Right patellar: 2+  Left patellar: 2+  Right Handy: absent  Left Handy: absent  Right ankle clonus: absent  Left ankle clonus: absent        MEDICAL DECISION MAKING    Imaging Studies:     No results found. I have personally reviewed pertinent reports.    and I have personally reviewed pertinent films in PACS

## 2023-11-24 ENCOUNTER — HOSPITAL ENCOUNTER (OUTPATIENT)
Dept: RADIOLOGY | Age: 68
Discharge: HOME | End: 2023-11-24
Payer: MEDICARE

## 2023-11-24 DIAGNOSIS — I67.1 ANEURYSM OF CAVERNOUS PORTION OF LEFT INTERNAL CAROTID ARTERY: ICD-10-CM

## 2023-11-24 PROCEDURE — 70546 MR ANGIOGRAPH HEAD W/O&W/DYE: CPT

## 2023-11-24 PROCEDURE — A9585 GADOBUTROL INJECTION: HCPCS | Performed by: PHYSICIAN ASSISTANT

## 2023-11-24 PROCEDURE — G1004 CDSM NDSC: HCPCS

## 2023-11-24 RX ORDER — GADOBUTROL 604.72 MG/ML
6 INJECTION INTRAVENOUS
Status: COMPLETED | OUTPATIENT
Start: 2023-11-24 | End: 2023-11-24

## 2023-11-24 RX ADMIN — GADOBUTROL 6 ML: 604.72 INJECTION INTRAVENOUS at 12:45

## 2023-12-04 ENCOUNTER — OFFICE VISIT (OUTPATIENT)
Dept: NEUROSURGERY | Facility: CLINIC | Age: 68
End: 2023-12-04
Payer: MEDICARE

## 2023-12-04 ENCOUNTER — APPOINTMENT (OUTPATIENT)
Dept: LAB | Facility: CLINIC | Age: 68
End: 2023-12-04
Payer: MEDICARE

## 2023-12-04 VITALS
HEART RATE: 88 BPM | TEMPERATURE: 97.8 F | SYSTOLIC BLOOD PRESSURE: 124 MMHG | BODY MASS INDEX: 23.41 KG/M2 | HEIGHT: 61 IN | DIASTOLIC BLOOD PRESSURE: 84 MMHG | OXYGEN SATURATION: 96 % | WEIGHT: 124 LBS | RESPIRATION RATE: 16 BRPM

## 2023-12-04 DIAGNOSIS — E55.9 VITAMIN D DEFICIENCY: ICD-10-CM

## 2023-12-04 DIAGNOSIS — M85.89 OSTEOPENIA OF MULTIPLE SITES: ICD-10-CM

## 2023-12-04 DIAGNOSIS — I67.1 ANEURYSM OF CAVERNOUS PORTION OF LEFT INTERNAL CAROTID ARTERY: ICD-10-CM

## 2023-12-04 DIAGNOSIS — I67.1 ANEURYSM OF CAVERNOUS PORTION OF LEFT INTERNAL CAROTID ARTERY: Primary | ICD-10-CM

## 2023-12-04 DIAGNOSIS — D32.9 MENINGIOMA (HCC): ICD-10-CM

## 2023-12-04 LAB — PA ADP BLD-ACNC: 102 PRU (ref 194–418)

## 2023-12-04 PROCEDURE — 36415 COLL VENOUS BLD VENIPUNCTURE: CPT

## 2023-12-04 PROCEDURE — 85576 BLOOD PLATELET AGGREGATION: CPT

## 2023-12-04 PROCEDURE — 99215 OFFICE O/P EST HI 40 MIN: CPT | Performed by: NEUROLOGICAL SURGERY

## 2023-12-04 NOTE — PROGRESS NOTES
Neurosurgery Office Note  Evelyn Deleon 76 y.o. female MRN: [id number]    Addendum:    P2Y12 102. Reviewed with Dr. Jose Elias Hammond, given patient is reluctant to stop Plavix we will leave PCP to write further prescriptions. Recommend continuing aspirin 325 mg and patient will follow-up in 2 years with MRA head with and without contrast as well as MRI brain with and without contrast.  Patient made aware to contact neurosurgery with any further questions or concerns. Assessment/Plan     Aneurysm of cavernous portion of left internal carotid artery  H/o cerebral aneurysm with prior treatment   S/p coil embolization of cerebral aneurysm 3/22/2019 with Dr. Sean Carter  S/p pipeline embolization of cerebral aneurysm possibly 10/18/2021 with Dr. Estrada Prior although records from [facility] not directly available to review. Currently on ASA 325mg and plavix 75mg. Patient with known parafalcine meningioma. Imaging reviewed with :   MRA head w/wo 11/24/23:Stable left cavernous-supraclinoid ICA pipeline stent and coil embolization of medially oriented paraclinoid aneurysm with susceptibility artifacts. There is no suspicious flow related signal or enhancement within the coil pack . Plan:   Monitor for symptoms  Reviewed imaging with patient and compared prior as well. Patient without recent follow up with neurology- h/o amaurosis fugax in April 2022 during short period of time off AC/AP   Recommended by neurosurgical provider after hospitalization 4/2022 to transition to ASA monotherapy, but reports conversation after discharge, patient was told to continue with DAPT  Patient now >2 years s/p stenting and should not require DAPT  Will discuss with patient after P2Y12 possible ASA monotherapy   We will contact patient with results from P2Y12 at a later date with plan.   Patient aware to seek care sooner if she develops any new or worsening neurological change or if like signs  Patient made aware to contact neurosurgery with any further questions or concerns. Diagnoses and all orders for this visit:    Aneurysm of cavernous portion of left internal carotid artery  -     Cancel: MRI angiogram head with and without contrast; Future  -     Cancel: BUN; Future  -     Cancel: Creatinine, serum; Future  -     P2Y12 Platelet inhibitor; Future    Meningioma (HCC)  -     Cancel: MRI brain with and without contrast; Future  -     Cancel: BUN; Future  -     Cancel: Creatinine, serum; Future          I have spent a total time of 40 minutes on 12/04/23 in caring for this patient including Diagnostic results, Risks and benefits of tx options, Instructions for management, Patient and family education, Importance of tx compliance, Risk factor reductions, Impressions, Counseling / Coordination of care, Documenting in the medical record, Reviewing / ordering tests, medicine, procedures  , Obtaining or reviewing history  , and Communicating with other healthcare professionals . CHIEF COMPLAINT    Chief Complaint   Patient presents with    Follow-up       HISTORY    History of Present Illness     76y.o. year old female with past medical history significant for hypothyroidism, left ICA aneurysm, amaurosis fugax, osteoarthritis, anxiety, depression, HPV, and meningioma. Patient seen in outpatient office today for 1 year follow-up since last evaluation of cerebral aneurysm with MRA head. Patient was last seen in our office in October 2023 to establish care for history of cerebral aneurysm status posttreatment. Patient suffered from a syncopal event in 2019. Her PCP ordered an MRI of her brain which incidentally noted a cavernous cerebral aneurysm. Compared to prior MRA imaging this was reported to have enlarged since 2018 and was recommended treatment by Jude Hernández at [facility] neurosurgery at that time. She underwent a successful coil embolization of her aneurysm on 3/22/2019.   Her established neurosurgeon moved and began practicing with [facility] where she was seen in follow-up and recommended further treatment including pipeline embolization of her left cavernous ICA aneurysm presumably in October 2021. Able to review some records from care everywhere although limited information is available. Had patient sign release form to try and obtain prior records. Patient does not remember the exact time of her last treatment. She did undergo a CTA within [facility] system in October 2019 which does show stent in place. States since that time she has remained on aspirin and Plavix. She states though about a year ago she was undergoing another procedure and was taken off of her aspirin when she had multiple TIAs and was placed back on aspirin as well as atorvastatin. She does endorse 3 weeks ago having right-sided headaches intermittently which usually start around noon, she denies a headache today. She denies any dizziness, blurry vision, chest pain, shortness breath, abdominal pain, nausea, vomiting, diarrhea, no new problems with bowel or bladder, no new weakness or numbness/tingling. Patient denies any family history of cerebral aneurysms or sudden death. She has not had any history of aneurysm rupture. She denies history of hypertension. She denies history of hyperlipidemia but was placed on a statin. She continues to be a 1 pack/day smoker, reiterated the importance of smoking cessation. She was previously noted to have a 7 mm parafalcine meningioma in the past.    Both myself and Dr. Nery Canas reviewed imaging with her. Dr. Nery Canas would like her to continue on aspirin monotherapy, patient is very reluctant to do this given history of TIA once aspirin was stopped. Ordered P2Y12. We will call patient with results. Did briefly discuss possible angiogram to see if aneurysm is completely gone and to further discuss if need for aspirin/Plavix.     We will contact patient once P2Y12 completed with further plan, patient made aware to contact neurosurgery with any further questions or concerns. HPI    See Discussion    REVIEW OF SYSTEMS    Review of Systems   Gastrointestinal:  Negative for nausea and vomiting. Neurological:  Positive for headaches (HA, going on for about 3 wks on right side of head). HX OF ANEURYSM NO RECENT IMAGING      PT HAD PREVIOUS ANEURYSM, COILING 2019 AND  STENT PLACED [id number]         No family hx of aneurysm    Hematological:         Plavix and ASA        ROS reviewed with patient and agree and changes were made as needed. Meds/Allergies     Current Outpatient Medications   Medication Sig Dispense Refill    aspirin 325 mg tablet Take 325 mg by mouth daily      atorvastatin (LIPITOR) 40 mg tablet Take 1 tablet (40 mg total) by mouth daily with dinner (Patient taking differently: Take 20 mg by mouth daily with dinner) 30 tablet 0    B Complex Vitamins (VITAMIN-B COMPLEX PO) Take by mouth      Calcium Carbonate-Vit D-Min (CALCIUM 1200 PO) Take by mouth      Cholecalciferol (Vitamin D) 125 MCG (5000 UT) CAPS Take by mouth      clopidogrel (PLAVIX) 75 mg tablet Take 1 tablet (75 mg total) by mouth daily for 7 days 7 tablet 0    cyclobenzaprine (FLEXERIL) 5 mg tablet if needed      levothyroxine 112 mcg tablet Take 112 mcg by mouth daily      LORazepam (ATIVAN) 0.5 mg tablet Take 0.5 mg by mouth daily as needed      Multiple Vitamins-Minerals (WOMENS MULTIVITAMIN PO) Take 1 capsule by mouth daily      traZODone (DESYREL) 50 mg tablet Take 50 mg by mouth daily at bedtime      zinc gluconate 50 mg tablet Take 50 mg by mouth daily       No current facility-administered medications for this visit.        No Known Allergies    PAST HISTORY    Past Medical History:   Diagnosis Date    Anxiety and depression     Brain aneurysm     Cerebral aneurysm without rupture     Disease of thyroid gland     H/O toxic shock syndrome     HPV (human papilloma virus) infection     Hypothyroidism     Meningioma ([address])     Microhematuria     Monoclonal gammopathy     Osteoarthritis        Past Surgical History:   Procedure Laterality Date    AUGMENTATION BREAST      CEREBRAL ANEURYSM REPAIR      intracranial stent    CYSTOSCOPY      FOOT SURGERY Right     bunionectomy    HAND SURGERY Right     CTR    IR CEREBRAL ANEURYSM COILING      OOPHORECTOMY Right     SALPINGECTOMY         Social History     Tobacco Use    Smoking status: Every Day     Packs/day: 1.00     Types: Cigarettes    Smokeless tobacco: Never   Substance Use Topics    Alcohol use: Never    Drug use: Yes     Types: Marijuana     Comment: medical marijuana       Family History   Problem Relation Age of Onset    Heart failure Mother     Arthritis Mother     Hypothyroidism Mother     Hypertension Mother     Hyperlipidemia Mother     Stroke Mother     Lung cancer Father     Emphysema Father     Heart disease Brother          Above history personally reviewed. EXAM    Vitals:Blood pressure 124/84, pulse 88, temperature 97.8 °F (36.6 °C), temperature source Temporal, resp. rate 16, height 5' 1" (1.549 m), weight 56.2 kg (124 lb), SpO2 96 %. ,Body mass index is 23.43 kg/m². Physical Exam  Vitals reviewed. Constitutional:       General: She is awake. She is not in acute distress. Appearance: Normal appearance. She is not ill-appearing. HENT:      Head: Normocephalic and atraumatic. Eyes:      Extraocular Movements: Extraocular movements intact and EOM normal.      Conjunctiva/sclera: Conjunctivae normal.      Pupils: Pupils are equal, round, and reactive to light. Pulmonary:      Effort: Pulmonary effort is normal. No respiratory distress. Chest:      Chest wall: No tenderness. Abdominal:      General: There is no distension. Palpations: Abdomen is soft. Tenderness: There is no abdominal tenderness. Musculoskeletal:         General: Normal range of motion. Cervical back: Normal range of motion and neck supple.    Skin: General: Skin is warm and dry. Neurological:      Mental Status: She is alert and oriented to person, place, and time. Motor: Motor strength is normal.     Coordination: Finger-Nose-Finger Test normal.      Gait: Gait is intact. Deep Tendon Reflexes:      Reflex Scores:       Bicep reflexes are 2+ on the right side and 2+ on the left side. Patellar reflexes are 2+ on the right side and 2+ on the left side. Psychiatric:         Attention and Perception: Attention and perception normal.         Mood and Affect: Mood and affect normal.         Speech: Speech normal.         Behavior: Behavior normal. Behavior is cooperative. Thought Content: Thought content normal.         Cognition and Memory: Cognition and memory normal.         Judgment: Judgment normal.         Neurologic Exam     Mental Status   Oriented to person, place, and time. Follows 2 step commands. Attention: normal. Concentration: normal.   Speech: speech is normal   Level of consciousness: alert  Knowledge: good. Able to perform simple calculations. Able to name object. Normal comprehension. Cranial Nerves     CN III, IV, VI   Pupils are equal, round, and reactive to light. Extraocular motions are normal.   CN III: no CN III palsy  CN VI: no CN VI palsy  Nystagmus: none   Diplopia: none  Conjugate gaze: present    CN V   Facial sensation intact. CN VII   Facial expression full, symmetric. CN VIII   CN VIII normal.   Hearing: intact    CN IX, X   CN IX normal.     CN XI   CN XI normal.     CN XII   CN XII normal.     Motor Exam   Muscle bulk: normal  Overall muscle tone: normal  Right arm pronator drift: absent  Left arm pronator drift: absent    Strength   Strength 5/5 throughout.      Sensory Exam   Light touch normal.     Gait, Coordination, and Reflexes     Gait  Gait: normal    Coordination   Finger to nose coordination: normal    Tremor   Resting tremor: absent  Intention tremor: absent  Action tremor: absent    Reflexes   Right biceps: 2+  Left biceps: 2+  Right patellar: 2+  Left patellar: 2+  Right Handy: absent  Left Handy: absent  Right ankle clonus: absent  Left ankle clonus: absent        MEDICAL DECISION MAKING    Imaging Studies:     MRA head w wo contrast    Result Date: 11/29/2023  Narrative: MRA BRAIN WITH AND WITHOUT CONTRAST INDICATION:  I67.1: Cerebral aneurysm, nonruptured COMPARISON: CTA head and neck 4/20/2022 TECHNIQUE:  Axial 3-D time-of-flight imaging with 3-D reconstructions performed with and without contrast.  Axial spoiled gradient 3-D post contrast in multiple phases. IV Contrast:  6 mL of Gadobutrol injection (SINGLE-DOSE) FINDINGS: IMAGE QUALITY:  Diagnostic. ANATOMY INTERNAL CAROTID ARTERIES: Stable left cavernous-supraclinoid ICA pipeline stent and coil embolization of medially oriented paraclinoid aneurysm with susceptibility artifacts. There is no suspicious flow related signal or enhancement within the coil pack . Preserved flow related enhancement of the distal cervical, petrous and cavernous segments of the internal carotid arteries. Normal ICA terminus. ANTERIOR CIRCULATION:  Normal A1 segments. Normal anterior communicating artery. Normal flow-related enhancement of the anterior cerebral arteries. MIDDLE CEREBRAL ARTERY CIRCULATION:  The M1 segment and middle cerebral artery branches demonstrate normal flow-related enhancement. DISTAL VERTEBRAL ARTERIES:  Distal vertebral arteries are patent with a normal vertebrobasilar junction. The posterior inferior cerebellar artery origins are normal. BASILAR ARTERY:  Normal. POSTERIOR CEREBRAL ARTERIES:  The right posterior cerebral artery arises from the basilar tip. There is fetal origin of the left posterior cerebral artery. Both demonstrate no focal stenosis. Normal posterior communicating arteries.      Impression: Stable left cavernous-supraclinoid ICA pipeline stent and coil embolization of medially oriented paraclinoid aneurysm with susceptibility artifacts. There is no suspicious flow related signal or enhancement within the coil pack . Workstation performed: [id number]       I have personally reviewed pertinent reports.    and I have personally reviewed pertinent films in PACS

## 2023-12-04 NOTE — ASSESSMENT & PLAN NOTE
H/o cerebral aneurysm with prior treatment   S/p coil embolization of cerebral aneurysm 3/22/2019 with Dr. Dominique Duarte  S/p pipeline embolization of cerebral aneurysm possibly 10/18/2021 with Dr. Dominique Duarte although records from [facility] not directly available to review. Currently on ASA 325mg and plavix 75mg. Patient with known parafalcine meningioma. Imaging reviewed with :   MRA head w/wo 11/24/23:Stable left cavernous-supraclinoid ICA pipeline stent and coil embolization of medially oriented paraclinoid aneurysm with susceptibility artifacts. There is no suspicious flow related signal or enhancement within the coil pack . Plan:   Monitor for symptoms  Reviewed imaging with patient and compared prior as well. Patient without recent follow up with neurology- h/o amaurosis fugax in April 2022 during short period of time off AC/AP   Recommended by neurosurgical provider after hospitalization 4/2022 to transition to ASA monotherapy, but reports conversation after discharge, patient was told to continue with DAPT  Patient now >2 years s/p stenting and should not require DAPT  Will discuss with patient after P2Y12 possible ASA monotherapy   We will contact patient with results from P2Y12 at a later date with plan. Patient aware to seek care sooner if she develops any new or worsening neurological change or if like signs  Patient made aware to contact neurosurgery with any further questions or concerns.

## 2023-12-14 ENCOUNTER — TELEPHONE (OUTPATIENT)
Dept: NEUROSURGERY | Facility: CLINIC | Age: 68
End: 2023-12-14

## 2023-12-14 NOTE — TELEPHONE ENCOUNTER
Received call from [facility] seeking clearance for patient to stop plavix for 2 days for dental extraction. Dr. Montez Osgood filled out form, as we are not the prescriber of this medication, we are unable to give clearance to stop.  This RN faxed form

## 2024-04-24 ENCOUNTER — TELEPHONE (OUTPATIENT)
Age: 69
End: 2024-04-24

## 2024-04-29 ENCOUNTER — TELEPHONE (OUTPATIENT)
Dept: GASTROENTEROLOGY | Facility: CLINIC | Age: 69
End: 2024-04-29

## 2024-04-29 NOTE — TELEPHONE ENCOUNTER
Pt is due for a 5 yr colon recall, hx of hyperplastic polyps, hx of rectal condyloma acuminata, pt or Dr Arizmendi. I lmom for pt to please call back to schedule with one of our GI providers as Dr Arizmendi has retired. Will call again if do not hear back from her.

## 2024-05-06 NOTE — TELEPHONE ENCOUNTER
Called and spoke to pt whom is requesting a call at the end of the summer to schedule. Updated recall date and will call at that time to try to schedule. If leave message, will mail recall letter at that time.

## 2024-09-20 ENCOUNTER — TELEPHONE (OUTPATIENT)
Dept: GASTROENTEROLOGY | Facility: CLINIC | Age: 69
End: 2024-09-20

## 2024-09-20 ENCOUNTER — OFFICE VISIT (OUTPATIENT)
Dept: GASTROENTEROLOGY | Facility: CLINIC | Age: 69
End: 2024-09-20
Payer: MEDICARE

## 2024-09-20 VITALS
HEART RATE: 60 BPM | HEIGHT: 61 IN | BODY MASS INDEX: 23.6 KG/M2 | DIASTOLIC BLOOD PRESSURE: 87 MMHG | WEIGHT: 125 LBS | SYSTOLIC BLOOD PRESSURE: 129 MMHG

## 2024-09-20 DIAGNOSIS — Z86.010 HX OF COLONIC POLYP: Primary | ICD-10-CM

## 2024-09-20 DIAGNOSIS — A63.0 CONDYLOMA ACUMINATA: ICD-10-CM

## 2024-09-20 PROCEDURE — 99204 OFFICE O/P NEW MOD 45 MIN: CPT | Performed by: PHYSICIAN ASSISTANT

## 2024-09-20 RX ORDER — SODIUM PICOSULFATE, MAGNESIUM OXIDE, AND ANHYDROUS CITRIC ACID 12; 3.5; 1 G/175ML; G/175ML; MG/175ML
LIQUID ORAL
Qty: 350 ML | Refills: 0 | Status: SHIPPED | OUTPATIENT
Start: 2024-09-20

## 2024-09-20 RX ORDER — ATORVASTATIN CALCIUM 20 MG/1
20 TABLET, FILM COATED ORAL DAILY
COMMUNITY
Start: 2024-07-01

## 2024-09-20 NOTE — TELEPHONE ENCOUNTER
Scheduled date of colonoscopy (as of today): 1/3/25  Physician performing colonoscopy: Dr Hudson  Location of colonoscopy: [facility]   Bowel prep reviewed with patient: Sujprep given at appt   Instructions reviewed with patient by: ls  Clearances: will fax Plavix clearance one month prior to procedure to Dr Headley Neuro surgeon [facility].

## 2024-09-20 NOTE — PROGRESS NOTES
[facility] - Outpatient Consultation  Leana Amaro 69 y.o. female MRN: [id number]  Encounter: [id number]          ASSESSMENT AND PLAN:      1. Hx of colonic polyp  2. Condyloma acuminata  69-year-old female who presents with history of colon polyps and does have a history of condyloma acuminata in the distal rectum pathology and both 2017 and 2019 did reveal condyloma acuminata and subsequently was referred to colorectal although she is not sure if there was any further intervention in these regards but she does know she underwent hemorrhoidectomy.  Will plan for colonoscopy for further evaluation and we will send clearance to neurosurgery in regards to holding the Plavix she can continue on aspirin up until the time of the procedure.  Patient is hesitant to hold this due to TIAs last year, we will make further recommendations once we discuss with neurosurgery.  Suprep given as patient cannot tolerate large volume prep.    ______________________________________________________________________    HPI:        69-year-old female who presents for consultation to colonoscopy she has history of hyperplastic polyps and she had a colonoscopy in 2017 and then had flexible sigmoidoscopy in 2019.  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  Polyps were noted to be hyperplastic and was recommended colonoscopy in 5 years which is now. She presents today to discuss colonoscopy and she tells me that she saw colorectal after the last procedure and underwent hemorrhoidectomy but she is not sure if anything was addressed with the condyloma.  This was performed by Dr. Harmon so I unfortunately do not have reported that.  She is asymptomatic from GI standpoint.  She does have aneurysm of left internal carotid and she tells me that last time she stopped her Plavix and aspirin for upcoming procedure with that she had 6 TIAs so she is hesitant to stop  the Plavix.    REVIEW OF SYSTEMS:    CONSTITUTIONAL: Denies any fever, chills, rigors, and weight loss.  HEENT: No earache or tinnitus. Denies hearing loss or visual disturbances.  CARDIOVASCULAR: No chest pain or palpitations.   RESPIRATORY: Denies any cough, hemoptysis, shortness of breath or dyspnea on exertion.  GASTROINTESTINAL: As noted in the History of Present Illness.   GENITOURINARY: No problems with urination. Denies any hematuria or dysuria.  NEUROLOGIC: No dizziness or vertigo, denies headaches.   MUSCULOSKELETAL: Denies any muscle or joint pain.   SKIN: Denies skin rashes or itching.   ENDOCRINE: Denies excessive thirst. Denies intolerance to heat or cold.  PSYCHOSOCIAL: Denies depression or anxiety. Denies any recent memory loss.       Historical Information   Past Medical History:   Diagnosis Date    Anxiety and depression     Brain aneurysm     Cerebral aneurysm without rupture     Disease of thyroid gland     H/O toxic shock syndrome     HPV (human papilloma virus) infection     Hypothyroidism     Meningioma (HCC)     Microhematuria     Monoclonal gammopathy     Osteoarthritis      Past Surgical History:   Procedure Laterality Date    AUGMENTATION BREAST      CEREBRAL ANEURYSM REPAIR      intracranial stent    CYSTOSCOPY      FOOT SURGERY Right     bunionectomy    HAND SURGERY Right     CTR    IR CEREBRAL ANEURYSM COILING      OOPHORECTOMY Right     SALPINGECTOMY       Social History   Social History     Substance and Sexual Activity   Alcohol Use Never     Social History     Substance and Sexual Activity   Drug Use Yes    Types: Marijuana    Comment: medical marijuana     Social History     Tobacco Use   Smoking Status Every Day    Current packs/day: 1.00    Types: Cigarettes   Smokeless Tobacco Never     Family History   Problem Relation Age of Onset    Heart failure Mother     Arthritis Mother     Hypothyroidism Mother     Hypertension Mother     Hyperlipidemia Mother     Stroke Mother     Lung  cancer Father     Emphysema Father     Heart disease Brother        Meds/Allergies       Current Outpatient Medications:     aspirin 325 mg tablet    atorvastatin (LIPITOR) 20 mg tablet    B Complex Vitamins (VITAMIN-B COMPLEX PO)    Calcium Carbonate-Vit D-Min (CALCIUM 1200 PO)    Cholecalciferol (Vitamin D) 125 MCG (5000 UT) CAPS    cyclobenzaprine (FLEXERIL) 5 mg tablet    levothyroxine 112 mcg tablet    LORazepam (ATIVAN) 0.5 mg tablet    Multiple Vitamins-Minerals (WOMENS MULTIVITAMIN PO)    zinc gluconate 50 mg tablet    atorvastatin (LIPITOR) 40 mg tablet    clopidogrel (PLAVIX) 75 mg tablet    traZODone (DESYREL) 50 mg tablet    No Known Allergies        Objective     There were no vitals taken for this visit. There is no height or weight on file to calculate BMI.        PHYSICAL EXAM:      General Appearance:   Alert, cooperative, no distress   HEENT:   Normocephalic, atraumatic, anicteric.     Neck:  Supple, symmetrical, trachea midline   Lungs:   Clear to auscultation bilaterally; no rales, rhonchi or wheezing; respirations unlabored    Heart::   Regular rate and rhythm; no murmur, rub, or gallop.   Abdomen:   Soft, non-tender, non-distended; normal bowel sounds; no masses, no organomegaly    Genitalia:   Deferred    Rectal:   Deferred    Extremities:  No cyanosis, clubbing or edema    Pulses:  2+ and symmetric    Skin:  No jaundice, rashes, or lesions    Lymph nodes:  No palpable cervical lymphadenopathy        Lab Results:   No visits with results within 1 Day(s) from this visit.   Latest known visit with results is:   Appointment on 12/04/2023   Component Date Value    P2Y12 12/04/2023 102 (L)          Radiology Results:   No results found.

## 2024-11-18 ENCOUNTER — OFFICE VISIT (OUTPATIENT)
Age: 69
End: 2024-11-18
Payer: MEDICARE

## 2024-11-18 VITALS
TEMPERATURE: 98.1 F | OXYGEN SATURATION: 100 % | HEART RATE: 67 BPM | WEIGHT: 127.2 LBS | SYSTOLIC BLOOD PRESSURE: 116 MMHG | HEIGHT: 63 IN | BODY MASS INDEX: 22.54 KG/M2 | DIASTOLIC BLOOD PRESSURE: 60 MMHG

## 2024-11-18 DIAGNOSIS — M15.0 PRIMARY GENERALIZED (OSTEO)ARTHRITIS: Primary | ICD-10-CM

## 2024-11-18 DIAGNOSIS — D47.2 MONOCLONAL GAMMOPATHY: ICD-10-CM

## 2024-11-18 DIAGNOSIS — I67.1 ANEURYSM OF CAVERNOUS PORTION OF LEFT INTERNAL CAROTID ARTERY: ICD-10-CM

## 2024-11-18 DIAGNOSIS — M47.816 LUMBAR SPONDYLOSIS: ICD-10-CM

## 2024-11-18 DIAGNOSIS — M85.89 OSTEOPENIA OF MULTIPLE SITES: ICD-10-CM

## 2024-11-18 DIAGNOSIS — M18.12 PRIMARY OSTEOARTHRITIS OF FIRST CARPOMETACARPAL JOINT OF LEFT HAND: ICD-10-CM

## 2024-11-18 PROBLEM — I72.9 ANEURYSM (HCC): Status: ACTIVE | Noted: 2024-11-18

## 2024-11-18 PROCEDURE — 99214 OFFICE O/P EST MOD 30 MIN: CPT | Performed by: INTERNAL MEDICINE

## 2024-11-18 NOTE — PROGRESS NOTES
Assessment/Plan:    Osteopenia of multiple sites  Osteopenia with DEXA dated 11/3/2023 significant for T-score L1, L3, and L4 -1.7 with an increase of 5.7% as compared with prior study, questionably related to degenerative spondylosis.  Left hip total -1.4 with femoral neck T-score -2.0 with an increase of 5.5% as compared to the prior study.  FRAX risk for hip fracture mildly elevated 3.2% with major fracture risk 12%.  Patient has no history of adult fracture.  No need for medication at this time, however, continue to recommend calcium and vitamin D supplement and home exercise program as tolerated.  Monitor BMD every 2 years.  If she has significant decrease in BMD, would consider patient for treatment with prescription medication.  Monitor vitamin D yearly.    Primary generalized (osteo)arthritis  Primary generalized osteoarthritis with interphalangeal osteoarthritis hands and feet, lumbar and cervical spondylosis.  She has nonradicular low back pain.  History neck pain currently quiescent.  She has had complaints of left first CMC joint arthralgias.  Have suggested thumb spica or cool comfort thumb splints in addition to paraffin wax bath for symptomatic relief.  Refer for updated hand x-rays to monitor extent of arthritis.  I did offer the patient occupational therapy but she preferred to wait on that.  She will call should she change her mind.  If symptoms are significant, could consider steroid injection for CMC joint.  Plan follow-up in 6 to 9 months or sooner if needed.  Continue to monitor.    Primary osteoarthritis of first carpometacarpal joint of left hand  Patient with first CMC osteoarthritis left greater than right.  Have suggested thumb spica splint or cool comfort splint for symptomatic relief in addition to paraffin wax bath.  I did suggest occupational therapy but she preferred to hold off on that at the present time.  She will call should she change her mind.  Could also consider steroid  injection if symptoms warrant.  Refer for updated bilateral hand films.  Follow-up 6 to 9 months or sooner if needed.    Lumbar spondylosis  History lumbar spondylosis with chronic low back pain generally nonradicular in nature.  She has had occasional lower extremity referred pain, however generally her symptoms are controlled with home exercise program and yoga on a consistent basis.  I have encouraged her to continue with home program.  If symptoms warrant, she will call and I will refer her for formal physical therapy.  Continue to monitor.    Monoclonal gammopathy  IgM kappa monoclonal band on serum protein electrophoresis with elevated IgM on quantitative immunoglobulins.  Mildly low IgG and IgA on quantitative immunoglobulins.  Free light chains from 9/18/2024 significant for mildly elevated kappa with normal lambda and very mildly elevated kappa lambda ratio.  Patient did have a negative bone marrow biopsy in April 2022.  Follow-up hematology.  Continue to monitor.      Aneurysm of cavernous portion of left internal carotid artery  History cerebral aneurysm with prior treatment status post coil embolization of cerebral aneurysm March 2019.  Status post pipeline embolization of cerebral aneurysm October 2021.  MRA head dated 11/24/2023 significant for stable left cavernous supraclinoid ICA pipeline stent and coil embolization of paraclinoid aneurysm.  Patient continues on aspirin and Plavix.  Follow-up neurosurgery as scheduled.         Problem List Items Addressed This Visit       Primary generalized (osteo)arthritis - Primary    Primary generalized osteoarthritis with interphalangeal osteoarthritis hands and feet, lumbar and cervical spondylosis.  She has nonradicular low back pain.  History neck pain currently quiescent.  She has had complaints of left first CMC joint arthralgias.  Have suggested thumb spica or cool comfort thumb splints in addition to paraffin wax bath for symptomatic relief.  Refer for  updated hand x-rays to monitor extent of arthritis.  I did offer the patient occupational therapy but she preferred to wait on that.  She will call should she change her mind.  If symptoms are significant, could consider steroid injection for CMC joint.  Plan follow-up in 6 to 9 months or sooner if needed.  Continue to monitor.         Relevant Orders    XR hand 3+ vw right    XR hand 3+ vw left    Lumbar spondylosis    History lumbar spondylosis with chronic low back pain generally nonradicular in nature.  She has had occasional lower extremity referred pain, however generally her symptoms are controlled with home exercise program and yoga on a consistent basis.  I have encouraged her to continue with home program.  If symptoms warrant, she will call and I will refer her for formal physical therapy.  Continue to monitor.         Monoclonal gammopathy    IgM kappa monoclonal band on serum protein electrophoresis with elevated IgM on quantitative immunoglobulins.  Mildly low IgG and IgA on quantitative immunoglobulins.  Free light chains from 9/18/2024 significant for mildly elevated kappa with normal lambda and very mildly elevated kappa lambda ratio.  Patient did have a negative bone marrow biopsy in April 2022.  Follow-up hematology.  Continue to monitor.           Aneurysm of cavernous portion of left internal carotid artery    History cerebral aneurysm with prior treatment status post coil embolization of cerebral aneurysm March 2019.  Status post pipeline embolization of cerebral aneurysm October 2021.  MRA head dated 11/24/2023 significant for stable left cavernous supraclinoid ICA pipeline stent and coil embolization of paraclinoid aneurysm.  Patient continues on aspirin and Plavix.  Follow-up neurosurgery as scheduled.         Osteopenia of multiple sites    Osteopenia with DEXA dated 11/3/2023 significant for T-score L1, L3, and L4 -1.7 with an increase of 5.7% as compared with prior study, questionably  related to degenerative spondylosis.  Left hip total -1.4 with femoral neck T-score -2.0 with an increase of 5.5% as compared to the prior study.  FRAX risk for hip fracture mildly elevated 3.2% with major fracture risk 12%.  Patient has no history of adult fracture.  No need for medication at this time, however, continue to recommend calcium and vitamin D supplement and home exercise program as tolerated.  Monitor BMD every 2 years.  If she has significant decrease in BMD, would consider patient for treatment with prescription medication.  Monitor vitamin D yearly.         Primary osteoarthritis of first carpometacarpal joint of left hand    Patient with first CMC osteoarthritis left greater than right.  Have suggested thumb spica splint or cool comfort splint for symptomatic relief in addition to paraffin wax bath.  I did suggest occupational therapy but she preferred to hold off on that at the present time.  She will call should she change her mind.  Could also consider steroid injection if symptoms warrant.  Refer for updated bilateral hand films.  Follow-up 6 to 9 months or sooner if needed.         Relevant Orders    XR hand 3+ vw right    XR hand 3+ vw left           Reviewed records, labs, and imaging with the patient in detail.  Counseled patient.  Discussion regarding my findings and recommendations.  Office visit with documentation 35 min.    Subjective:      Patient ID: Leana Amaro is a 69 y.o. female.    Patient presents for follow-up of her osteoarthritis and osteopenia.  Her most recent DEXA scan was done in November 2023 with T-score L1, L3, and L4 -1.7 with an increase of 5.7% as compared to the prior study.  Left hip total -1.4.  Femoral neck -2.0 with an increase of 5.5% as compared to the prior study.  FRAX risk for hip fracture 3.2% with major fracture risk 12%.  She has no history of adult fracture.   Patient has been involved with dental work since last April and is ultimately having  extraction with implants and all of her teeth.  She has had a 6 or 7 pound weight loss related to the extractions in April.    She has a history of generalized osteoarthritis.  She has pain in her left 1st CMC joint.  She does have a ganglion cyst in that area as well.  Overall her symptoms are stable with use of Voltaren gel as needed for pain.  She has a history of lumbar spondylosis.  She gets occasional lower back pain with no recent radicular symptoms.  She does note improvement in symptoms when she is consistent with yoga and home exercise program.     She is following with Hematology for IgM kappa monoclonal gammopathy.  Patient did have bone marrow biopsy in April 2022 which was negative for cancer.  Patient is scheduled for updated colonoscopy in January.  She does smoke 1 pack of cigarettes daily.    Lab work dated 9/18/2024 significant for serum protein electrophoresis with peak in the gamma region with immunofixation significant for monoclonal protein characterized as IgM kappa.  Free light chains with elevated IgG kappa of 21.58 and normal IgG lambda.  Ratio very slightly high at 1.67.  Quantitative immunoglobulins significant for low IgG 535.  IgA low 46.  IgM high 422.    Lumbar spine films dated 11/3/2023 significant for narrowing L2-3 and questionable L1-2 with mild spurring noted at L2-3 as well as slight retrolisthesis at L2-3.  Bilateral wrist films unremarkable.  Cervical spine films dated 12/29/2023 significant for multilevel spondylosis.  Patient has had history of neck pain.        Allergies  No Known Allergies    Home Medications    Current Outpatient Medications:     aspirin 325 mg tablet, Take 325 mg by mouth daily, Disp: , Rfl:     atorvastatin (LIPITOR) 20 mg tablet, Take 20 mg by mouth daily, Disp: , Rfl:     B Complex Vitamins (VITAMIN-B COMPLEX PO), Take by mouth, Disp: , Rfl:     Calcium Carbonate-Vit D-Min (CALCIUM 1200 PO), Take by mouth, Disp: , Rfl:     Cholecalciferol (Vitamin  D) 125 MCG (5000 UT) CAPS, Take by mouth, Disp: , Rfl:     cyclobenzaprine (FLEXERIL) 5 mg tablet, if needed, Disp: , Rfl:     levothyroxine 112 mcg tablet, Take 112 mcg by mouth daily, Disp: , Rfl:     LORazepam (ATIVAN) 0.5 mg tablet, Take 0.5 mg by mouth daily as needed, Disp: , Rfl:     Multiple Vitamins-Minerals (WOMENS MULTIVITAMIN PO), Take 1 capsule by mouth daily, Disp: , Rfl:     sodium picosulfate, magnesium oxide, citric acid (Clenpiq) oral solution, Take 175 mL (1 bottle) the evening before the colonoscopy, between 5 PM and 9 PM, followed by a second 175 mL bottle 5 hours before the colonoscopy., Disp: 350 mL, Rfl: 0    zinc gluconate 50 mg tablet, Take 50 mg by mouth daily, Disp: , Rfl:     atorvastatin (LIPITOR) 40 mg tablet, Take 1 tablet (40 mg total) by mouth daily with dinner (Patient taking differently: Take 20 mg by mouth daily with dinner), Disp: 30 tablet, Rfl: 0    clopidogrel (PLAVIX) 75 mg tablet, Take 1 tablet (75 mg total) by mouth daily for 7 days, Disp: 7 tablet, Rfl: 0    traZODone (DESYREL) 50 mg tablet, Take 50 mg by mouth daily at bedtime, Disp: , Rfl:     Past Medical History  Past Medical History:   Diagnosis Date    Anxiety and depression     Brain aneurysm     Cerebral aneurysm without rupture     Disease of thyroid gland     H/O toxic shock syndrome     HPV (human papilloma virus) infection     Hypothyroidism     Meningioma (HCC)     Microhematuria     Monoclonal gammopathy     Osteoarthritis        Past Surgical History   Past Surgical History:   Procedure Laterality Date    AUGMENTATION BREAST      CEREBRAL ANEURYSM REPAIR      intracranial stent    CYSTOSCOPY      FOOT SURGERY Right     bunionectomy    HAND SURGERY Right     CTR    IR CEREBRAL ANEURYSM COILING      OOPHORECTOMY Right     SALPINGECTOMY         Family History   Family History   Problem Relation Age of Onset    Heart failure Mother     Arthritis Mother     Hypothyroidism Mother     Hypertension Mother      "Hyperlipidemia Mother     Stroke Mother     Lung cancer Father     Emphysema Father     Heart disease Brother        The following portions of the patient's history were reviewed and updated as appropriate: allergies, current medications, past family history, past medical history, past social history, past surgical history, and problem list.    Review of Systems   Constitutional:  Negative for chills, fatigue and fever.   HENT:  Negative for hearing loss, sore throat and tinnitus.    Eyes:  Negative for pain and visual disturbance.   Respiratory:  Negative for cough and shortness of breath.    Cardiovascular:  Negative for chest pain and palpitations.   Gastrointestinal:  Negative for abdominal pain, nausea and vomiting.   Genitourinary:  Negative for difficulty urinating.   Musculoskeletal:  Positive for arthralgias and back pain. Negative for gait problem, joint swelling, myalgias, neck pain and neck stiffness.   Skin:  Negative for rash.   Neurological:  Negative for dizziness, seizures, weakness, numbness and headaches.   Psychiatric/Behavioral:  Negative for confusion, decreased concentration and sleep disturbance.          Objective:      /60 (BP Location: Right arm, Patient Position: Sitting, Cuff Size: Adult)   Pulse 67   Temp 98.1 °F (36.7 °C) (Temporal)   Ht 5' 2.5\" (1.588 m)   Wt 57.7 kg (127 lb 3.2 oz)   SpO2 100%   BMI 22.89 kg/m²          Physical Exam  Vitals reviewed.   Constitutional:       Appearance: Normal appearance.   HENT:      Head: Normocephalic.      Nose:      Comments: Nose and throat unremarkable.  Eyes:      Extraocular Movements: Extraocular movements intact.   Neck:      Comments: Without masses, thyromegaly, lymphadenopathy  Cardiovascular:      Rate and Rhythm: Normal rate and regular rhythm.   Pulmonary:      Breath sounds: Normal breath sounds.   Abdominal:      Palpations: Abdomen is soft.   Musculoskeletal:      Cervical back: Neck supple.      Comments: Neck " essentially full range of motion.  Shoulders, elbows, and wrists full range of motion.  Tinel's negative.  Hands squaring 1st carpometacarpal joints bilaterally left greater than right with questionable ganglion cysts left thenar eminence, and early scattered Heberden's nodes.  No synovitis noted.  Straight leg raising negative bilaterally.  Marked spasm noted dorsal and lumbosacral paraspinals.  Schober's negative.  No SI joint tenderness appreciated.  Hips full range of motion with tight hip flexors left>right, and  tenderness left trochanteric bursa to palpation.  Knees full range of motion with patellofemoral crepitus.  Ankles full range of motion.  Feet rearfoot hyperpronation with midfoot cavus deformities, with hallux valgus deformity left with overlapping 1st on 2nd toes, hammertoe deformities, high insteps.  Thickening plantar fascia with no tenderness.    Skin:     General: Skin is warm and dry.   Neurological:      General: No focal deficit present.      Mental Status: She is alert.               This note was written in part using the assistance of the [employer] [id number] microphone system. Those portions using this system have been dictated and not read.

## 2024-11-18 NOTE — PATIENT INSTRUCTIONS
For your thumb pain, go on [employer] and get either a thumb spica splint or a cool comfort splint.  It is a little Velcro splint that just fits around your thumb you can wear it all day.  It helps to rest the joint.  If you change your mind about going to hand therapy, just call the office.  If your symptoms become significantly worse, and you want a shot, call the office and we will get you in within a few days.  You can also get a paraffin wax bath machine on [employer] because that symptomatically helps you to feel better by dipping your hands in the hot wax.  It is great for dry skin as well in your hands and feet.  I did give you a prescription to get x-rays of both your hands.  I would like that as a baseline and you can get that at [facility] so I can actually look at the images.  You will not need a DEXA for another 2 years from your last which would be November 2025.  I will see you back in follow-up next spring or summer.  You can call if you have any questions.

## 2024-11-25 NOTE — ASSESSMENT & PLAN NOTE
Osteopenia with DEXA dated 11/3/2023 significant for T-score L1, L3, and L4 -1.7 with an increase of 5.7% as compared with prior study, questionably related to degenerative spondylosis.  Left hip total -1.4 with femoral neck T-score -2.0 with an increase of 5.5% as compared to the prior study.  FRAX risk for hip fracture mildly elevated 3.2% with major fracture risk 12%.  Patient has no history of adult fracture.  No need for medication at this time, however, continue to recommend calcium and vitamin D supplement and home exercise program as tolerated.  Monitor BMD every 2 years.  If she has significant decrease in BMD, would consider patient for treatment with prescription medication.  Monitor vitamin D yearly.

## 2024-11-25 NOTE — ASSESSMENT & PLAN NOTE
History cerebral aneurysm with prior treatment status post coil embolization of cerebral aneurysm March 2019.  Status post pipeline embolization of cerebral aneurysm October 2021.  MRA head dated 11/24/2023 significant for stable left cavernous supraclinoid ICA pipeline stent and coil embolization of paraclinoid aneurysm.  Patient continues on aspirin and Plavix.  Follow-up neurosurgery as scheduled.

## 2024-11-25 NOTE — ASSESSMENT & PLAN NOTE
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  She has nonradicular low back pain.  History neck pain currently quiescent.  She has had complaints of left first CMC joint arthralgias.  Have suggested thumb spica or cool comfort thumb splints in addition to paraffin wax bath for symptomatic relief.  Refer for updated hand x-rays to monitor extent of arthritis.  I did offer the patient occupational therapy but she preferred to wait on that.  She will call should she change her mind.  If symptoms are significant, could consider steroid injection for CMC joint.  Plan follow-up in 6 to 9 months or sooner if needed.  Continue to monitor.

## 2024-11-25 NOTE — ASSESSMENT & PLAN NOTE
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  Mildly low IgG and IgA on quantitative immunoglobulins.  Free light chains from 9/18/2024 significant for mildly elevated kappa with normal lambda and very mildly elevated kappa lambda ratio.  Patient did have a negative bone marrow biopsy in April 2022.  Follow-up hematology.  Continue to monitor.

## 2024-11-25 NOTE — ASSESSMENT & PLAN NOTE
History lumbar spondylosis with chronic low back pain generally nonradicular in nature.  She has had occasional lower extremity referred pain, however generally her symptoms are controlled with home exercise program and yoga on a consistent basis.  I have encouraged her to continue with home program.  If symptoms warrant, she will call and I will refer her for formal physical therapy.  Continue to monitor.

## 2024-11-25 NOTE — ASSESSMENT & PLAN NOTE
Patient with first CMC osteoarthritis left greater than right.  Have suggested thumb spica splint or cool comfort splint for symptomatic relief in addition to paraffin wax bath.  I did suggest occupational therapy but she preferred to hold off on that at the present time.  She will call should she change her mind.  Could also consider steroid injection if symptoms warrant.  Refer for updated bilateral hand films.  Follow-up 6 to 9 months or sooner if needed.

## 2024-12-20 ENCOUNTER — ANESTHESIA (OUTPATIENT)
Dept: ANESTHESIOLOGY | Facility: HOSPITAL | Age: 69
End: 2024-12-20

## 2024-12-20 ENCOUNTER — ANESTHESIA EVENT (OUTPATIENT)
Dept: ANESTHESIOLOGY | Facility: HOSPITAL | Age: 69
End: 2024-12-20

## 2024-12-30 ENCOUNTER — TELEPHONE (OUTPATIENT)
Age: 69
End: 2024-12-30

## 2024-12-30 NOTE — TELEPHONE ENCOUNTER
Patient calling to reschedule her colonoscopy. Colonoscopy has been rescheduled for 3/21/25 with  at [facility]. Patient has prep information and is aware of Plavix hold.

## 2025-03-07 ENCOUNTER — ANESTHESIA (OUTPATIENT)
Dept: ANESTHESIOLOGY | Facility: HOSPITAL | Age: 70
End: 2025-03-07

## 2025-03-07 ENCOUNTER — ANESTHESIA EVENT (OUTPATIENT)
Dept: ANESTHESIOLOGY | Facility: HOSPITAL | Age: 70
End: 2025-03-07

## 2025-03-21 ENCOUNTER — ANESTHESIA (OUTPATIENT)
Dept: GASTROENTEROLOGY | Facility: AMBULARY SURGERY CENTER | Age: 70
End: 2025-03-21
Payer: MEDICARE

## 2025-03-21 ENCOUNTER — HOSPITAL ENCOUNTER (OUTPATIENT)
Dept: GASTROENTEROLOGY | Facility: AMBULARY SURGERY CENTER | Age: 70
Setting detail: OUTPATIENT SURGERY
End: 2025-03-21
Payer: MEDICARE

## 2025-03-21 VITALS
RESPIRATION RATE: 16 BRPM | SYSTOLIC BLOOD PRESSURE: 129 MMHG | WEIGHT: 116 LBS | OXYGEN SATURATION: 99 % | DIASTOLIC BLOOD PRESSURE: 77 MMHG | HEIGHT: 61 IN | HEART RATE: 66 BPM | BODY MASS INDEX: 21.9 KG/M2 | TEMPERATURE: 96.7 F

## 2025-03-21 DIAGNOSIS — A63.0 CONDYLOMA ACUMINATA: ICD-10-CM

## 2025-03-21 DIAGNOSIS — Z86.0100 HX OF COLONIC POLYP: ICD-10-CM

## 2025-03-21 PROCEDURE — 45385 COLONOSCOPY W/LESION REMOVAL: CPT | Performed by: INTERNAL MEDICINE

## 2025-03-21 PROCEDURE — 88305 TISSUE EXAM BY PATHOLOGIST: CPT | Performed by: PATHOLOGY

## 2025-03-21 RX ORDER — PROPOFOL 10 MG/ML
INJECTION, EMULSION INTRAVENOUS CONTINUOUS PRN
Status: DISCONTINUED | OUTPATIENT
Start: 2025-03-21 | End: 2025-03-21

## 2025-03-21 RX ORDER — LIDOCAINE HYDROCHLORIDE 10 MG/ML
INJECTION, SOLUTION EPIDURAL; INFILTRATION; INTRACAUDAL; PERINEURAL AS NEEDED
Status: DISCONTINUED | OUTPATIENT
Start: 2025-03-21 | End: 2025-03-21

## 2025-03-21 RX ORDER — SODIUM CHLORIDE, SODIUM LACTATE, POTASSIUM CHLORIDE, CALCIUM CHLORIDE 600; 310; 30; 20 MG/100ML; MG/100ML; MG/100ML; MG/100ML
INJECTION, SOLUTION INTRAVENOUS CONTINUOUS PRN
Status: DISCONTINUED | OUTPATIENT
Start: 2025-03-21 | End: 2025-03-21

## 2025-03-21 RX ORDER — PROPOFOL 10 MG/ML
INJECTION, EMULSION INTRAVENOUS AS NEEDED
Status: DISCONTINUED | OUTPATIENT
Start: 2025-03-21 | End: 2025-03-21

## 2025-03-21 RX ADMIN — PROPOFOL 100 MCG/KG/MIN: 10 INJECTION, EMULSION INTRAVENOUS at 07:58

## 2025-03-21 RX ADMIN — SODIUM CHLORIDE, SODIUM LACTATE, POTASSIUM CHLORIDE, AND CALCIUM CHLORIDE: .6; .31; .03; .02 INJECTION, SOLUTION INTRAVENOUS at 07:55

## 2025-03-21 RX ADMIN — LIDOCAINE HYDROCHLORIDE 50 MG: 10 INJECTION, SOLUTION EPIDURAL; INFILTRATION; INTRACAUDAL at 07:58

## 2025-03-21 RX ADMIN — PROPOFOL 100 MG: 10 INJECTION, EMULSION INTRAVENOUS at 07:58

## 2025-03-21 NOTE — ANESTHESIA PREPROCEDURE EVALUATION
Procedure:  COLONOSCOPY    Relevant Problems   CARDIO   (+) Amaurosis fugax   (+) Aneurysm of cavernous portion of left internal carotid artery      ENDO   (+) Hypothyroidism      MUSCULOSKELETAL   (+) Lumbar spondylosis   (+) Primary generalized (osteo)arthritis   (+) Primary osteoarthritis of first carpometacarpal joint of left hand      NEURO/PSYCH   (+) Amaurosis fugax   (+) Intermittent left eye vision loss      TIA with eye concerns 2 yrs; no residual symptoms. Came from brain aneurysm and stopping her medicine for a procedure. She has had coiling and stent of her aneurysm, and now doing quite well according to patient.     HepC now treated.    NPO 2am prep 3/21    Physical Exam    Airway    Mallampati score: II  TM Distance: >3 FB  Neck ROM: full     Dental       Cardiovascular  Cardiovascular exam normal    Pulmonary  Pulmonary exam normal     Other Findings  post-pubertal.      Anesthesia Plan  ASA Score- 3     Anesthesia Type- IV sedation with anesthesia with ASA Monitors.         Additional Monitors:     Airway Plan:            Plan Factors-Exercise tolerance (METS): >4 METS.    Chart reviewed. EKG reviewed.  Existing labs reviewed. Patient summary reviewed.    Patient is not a current smoker.  Patient did not smoke on day of surgery.    Obstructive sleep apnea risk education given perioperatively.        Induction- intravenous.    Postoperative Plan-     Perioperative Resuscitation Plan - Level 1 - Full Code.       Informed Consent- Anesthetic plan and risks discussed with patient.  I personally reviewed this patient with the CRNA. Discussed and agreed on the Anesthesia Plan with the CRNA..      NPO Status:  Vitals Value Taken Time   Date of last liquid 03/21/25 03/21/25 0711   Time of last liquid 0300 03/21/25 0711   Date of last solid 03/20/25 03/21/25 0711   Time of last solid 0001 03/21/25 0711

## 2025-03-21 NOTE — H&P
"History and Physical -  [facility]  Leana Amaro 69 y.o. female MRN: [id number]    HPI: Leana Amaro is a 69 y.o. year old female who presents for evaluation of colon polyps.      Review of Systems    Historical Information   Past Medical History:   Diagnosis Date    Anxiety and depression     Brain aneurysm     Cerebral aneurysm without rupture     Colon polyp     Disease of thyroid gland     H/O toxic shock syndrome     Hepatitis C     treated    HPV (human papilloma virus) infection     Hypothyroidism     Meningioma (HCC)     Microhematuria     Monoclonal gammopathy     Osteoarthritis      Past Surgical History:   Procedure Laterality Date    AUGMENTATION BREAST      BREAST SURGERY      implants    CEREBRAL ANEURYSM REPAIR      intracranial stent    CYSTOSCOPY      FOOT SURGERY Right     bunionectomy    HAND SURGERY Right     CTR    IR CEREBRAL ANEURYSM COILING      OOPHORECTOMY Right     SALPINGECTOMY       Social History   Social History     Substance and Sexual Activity   Alcohol Use Never     Social History     Substance and Sexual Activity   Drug Use Yes    Types: Marijuana    Comment: medical marijuana     Social History     Tobacco Use   Smoking Status Every Day    Current packs/day: 1.00    Types: Cigarettes   Smokeless Tobacco Never     Family History   Problem Relation Age of Onset    Heart failure Mother     Arthritis Mother     Hypothyroidism Mother     Hypertension Mother     Hyperlipidemia Mother     Stroke Mother     Lung cancer Father     Emphysema Father     Heart disease Brother        Meds/Allergies     Not in a hospital admission.    No Known Allergies    Objective     /73   Pulse 62   Temp (!) 97 °F (36.1 °C) (Temporal)   Resp 18   Ht 5' 1\" (1.549 m)   Wt 52.6 kg (116 lb)   SpO2 100%   BMI 21.92 kg/m²       PHYSICAL EXAM    Gen: NAD  CV: RRR  CHEST: Clear  ABD: soft, NT/ND  EXT: no edema  Neuro: AAO      ASSESSMENT/PLAN:  This is a 69 y.o. year old female here " for evaluation of colon polyps.  Patient was explained about the risks and benefits of the procedure. Risks including but not limited to bleeding, infection, perforation were explained in detail.       PLAN:   Procedure: Colonoscopy.

## 2025-03-21 NOTE — ANESTHESIA POSTPROCEDURE EVALUATION
Post-Op Assessment Note    CV Status:  Stable  Pain Score: 0    Pain management: adequate       Mental Status:  Arousable and sleepy   Hydration Status:  Stable and euvolemic   PONV Controlled:  Controlled   Airway Patency:  Patent and adequate     Post Op Vitals Reviewed: Yes    No anethesia notable event occurred.    Staff: CRNA           Last Filed PACU Vitals:  Vitals Value Taken Time   Temp     Pulse 69    /75    Resp 16    SpO2 99

## 2025-03-28 PROCEDURE — 88305 TISSUE EXAM BY PATHOLOGIST: CPT | Performed by: PATHOLOGY

## 2025-03-30 ENCOUNTER — RESULTS FOLLOW-UP (OUTPATIENT)
Age: 70
End: 2025-03-30

## 2025-05-21 ENCOUNTER — TELEPHONE (OUTPATIENT)
Age: 70
End: 2025-05-21

## 2025-05-21 NOTE — TELEPHONE ENCOUNTER
[facility] is faxing over a pre op clearance form for an upcoming surgery.

## 2025-05-23 NOTE — TELEPHONE ENCOUNTER
Taran! Leana was last seen in Dec 2023 is on 2 year fup plan for aneurysm of left cavernous ICA. Any objections to breast surgery at this point? Thanks!

## 2025-05-23 NOTE — TELEPHONE ENCOUNTER
05/23/25 Pt has surgery is scheduled 08/22/25 with [facility]. Pt stated a clearance form was faxed to our office and she would like to know if she needs to come in for an appt. Please call pt.

## 2025-05-27 ENCOUNTER — TELEPHONE (OUTPATIENT)
Dept: NEUROSURGERY | Facility: CLINIC | Age: 70
End: 2025-05-27

## 2025-05-27 NOTE — TELEPHONE ENCOUNTER
Contacted patient and advised that she does not need an appointment as per in basket from Dr. Headley nurse Andreia is waiting for forms to be faxed to our office to complete patient stated she will call to have forms sent to us provided fax number for our office to patient.